# Patient Record
Sex: FEMALE | Race: WHITE | Employment: FULL TIME | URBAN - NONMETROPOLITAN AREA
[De-identification: names, ages, dates, MRNs, and addresses within clinical notes are randomized per-mention and may not be internally consistent; named-entity substitution may affect disease eponyms.]

---

## 2017-07-18 ENCOUNTER — HOSPITAL ENCOUNTER (EMERGENCY)
Age: 37
Discharge: HOME | End: 2017-07-18
Payer: MEDICARE

## 2017-07-18 VITALS
HEART RATE: 71 BPM | WEIGHT: 200 LBS | BODY MASS INDEX: 32.28 KG/M2 | DIASTOLIC BLOOD PRESSURE: 74 MMHG | SYSTOLIC BLOOD PRESSURE: 136 MMHG | RESPIRATION RATE: 16 BRPM | TEMPERATURE: 98.4 F | OXYGEN SATURATION: 98 %

## 2017-07-18 DIAGNOSIS — M77.02 EPICONDYLITIS ELBOW, MEDIAL, LEFT: ICD-10-CM

## 2017-07-18 DIAGNOSIS — M77.11 LATERAL EPICONDYLITIS OF BOTH ELBOWS: Primary | ICD-10-CM

## 2017-07-18 DIAGNOSIS — M77.01 EPICONDYLITIS ELBOW, MEDIAL, RIGHT: ICD-10-CM

## 2017-07-18 DIAGNOSIS — M77.12 LATERAL EPICONDYLITIS OF BOTH ELBOWS: Primary | ICD-10-CM

## 2017-07-18 PROCEDURE — 99213 OFFICE O/P EST LOW 20 MIN: CPT | Performed by: NURSE PRACTITIONER

## 2017-07-18 PROCEDURE — 99212 OFFICE O/P EST SF 10 MIN: CPT

## 2017-07-18 RX ORDER — NAPROXEN 500 MG/1
500 TABLET ORAL 2 TIMES DAILY
Qty: 20 TABLET | Refills: 0 | Status: SHIPPED | OUTPATIENT
Start: 2017-07-18 | End: 2017-12-18

## 2017-07-18 RX ORDER — PREDNISONE 10 MG/1
20 TABLET ORAL 2 TIMES DAILY
Qty: 14 TABLET | Refills: 0 | Status: SHIPPED | OUTPATIENT
Start: 2017-07-18 | End: 2017-07-25

## 2017-07-18 ASSESSMENT — ENCOUNTER SYMPTOMS
SHORTNESS OF BREATH: 0
ABDOMINAL PAIN: 0

## 2017-07-18 ASSESSMENT — PAIN DESCRIPTION - LOCATION: LOCATION: ELBOW

## 2017-07-18 ASSESSMENT — PAIN DESCRIPTION - PAIN TYPE: TYPE: ACUTE PAIN

## 2017-07-18 ASSESSMENT — PAIN SCALES - GENERAL: PAINLEVEL_OUTOF10: 10

## 2017-07-18 ASSESSMENT — PAIN DESCRIPTION - DESCRIPTORS: DESCRIPTORS: CONSTANT;SHARP;STABBING

## 2017-07-18 ASSESSMENT — PAIN DESCRIPTION - ORIENTATION: ORIENTATION: LEFT

## 2017-07-19 ENCOUNTER — OFFICE VISIT (OUTPATIENT)
Dept: FAMILY MEDICINE CLINIC | Age: 37
End: 2017-07-19
Payer: MEDICARE

## 2017-07-19 VITALS
TEMPERATURE: 98.2 F | WEIGHT: 200 LBS | SYSTOLIC BLOOD PRESSURE: 139 MMHG | BODY MASS INDEX: 32.14 KG/M2 | HEIGHT: 66 IN | DIASTOLIC BLOOD PRESSURE: 86 MMHG | HEART RATE: 69 BPM

## 2017-07-19 DIAGNOSIS — M77.12 EPICONDYLITIS, LATERAL, LEFT: ICD-10-CM

## 2017-07-19 DIAGNOSIS — M77.02 EPICONDYLITIS ELBOW, MEDIAL, LEFT: Primary | ICD-10-CM

## 2017-07-19 PROCEDURE — 99202 OFFICE O/P NEW SF 15 MIN: CPT | Performed by: NURSE PRACTITIONER

## 2017-07-19 ASSESSMENT — ENCOUNTER SYMPTOMS: COLOR CHANGE: 0

## 2017-12-18 ENCOUNTER — HOSPITAL ENCOUNTER (EMERGENCY)
Age: 37
Discharge: HOME | End: 2017-12-18
Payer: COMMERCIAL

## 2017-12-18 VITALS
TEMPERATURE: 98.5 F | HEART RATE: 75 BPM | OXYGEN SATURATION: 99 % | BODY MASS INDEX: 32.14 KG/M2 | RESPIRATION RATE: 18 BRPM | WEIGHT: 200 LBS | HEIGHT: 66 IN | DIASTOLIC BLOOD PRESSURE: 70 MMHG | SYSTOLIC BLOOD PRESSURE: 145 MMHG

## 2017-12-18 DIAGNOSIS — R19.7 DIARRHEA, UNSPECIFIED TYPE: Primary | ICD-10-CM

## 2017-12-18 LAB
BILIRUBIN URINE: NEGATIVE
BLOOD, URINE: NORMAL
CHARACTER, URINE: CLEAR
COLOR: YELLOW
GLUCOSE, URINE: NEGATIVE MG/DL
KETONES, URINE: NEGATIVE
LEUKOCYTES, UA: NEGATIVE
NITRATE, UA: NEGATIVE
PH UA: 7 (ref 5–9)
PROTEIN UA: NEGATIVE MG/DL
REFLEX TO URINE C & S: NORMAL
SPECIFIC GRAVITY UA: 1.01 (ref 1–1.03)
UROBILINOGEN, URINE: 0.2 EU/DL (ref 0–1)

## 2017-12-18 PROCEDURE — 81003 URINALYSIS AUTO W/O SCOPE: CPT

## 2017-12-18 PROCEDURE — 99213 OFFICE O/P EST LOW 20 MIN: CPT | Performed by: NURSE PRACTITIONER

## 2017-12-18 PROCEDURE — 99214 OFFICE O/P EST MOD 30 MIN: CPT

## 2017-12-18 ASSESSMENT — PAIN DESCRIPTION - ORIENTATION: ORIENTATION: MID

## 2017-12-18 ASSESSMENT — PAIN SCALES - GENERAL: PAINLEVEL_OUTOF10: 8

## 2017-12-18 ASSESSMENT — PAIN DESCRIPTION - DESCRIPTORS: DESCRIPTORS: CRAMPING

## 2017-12-18 ASSESSMENT — PAIN DESCRIPTION - LOCATION: LOCATION: ABDOMEN

## 2017-12-18 ASSESSMENT — ENCOUNTER SYMPTOMS
RECENT COUGH: 0
RESPIRATORY NEGATIVE: 1
VOMITING: 0
ABDOMINAL PAIN: 1
DIARRHEA: 1

## 2017-12-18 ASSESSMENT — PAIN DESCRIPTION - PAIN TYPE: TYPE: ACUTE PAIN

## 2017-12-18 ASSESSMENT — PAIN DESCRIPTION - FREQUENCY: FREQUENCY: INTERMITTENT

## 2017-12-18 NOTE — LETTER
[address] [facility]  [address] [place] [id number]  Phone: [phone]               December 18, 2017    Patient: Rabia Silveira   YOB: 1980   Date of Visit: 12/18/2017       To Whom It May Concern:    Abner Gudino was seen and treated in our emergency department on 12/18/2017. She may return to work on 12/19/2017.       Sincerely,       Wanda Bauman NP         Signature:_Electronically signed by Wanda Bauman NP on 12/18/2017 at 2:11 PM  _________________________________

## 2017-12-27 ENCOUNTER — HOSPITAL ENCOUNTER (OUTPATIENT)
Dept: ULTRASOUND IMAGING | Age: 37
Discharge: HOME | End: 2017-12-27
Payer: COMMERCIAL

## 2017-12-27 DIAGNOSIS — R10.9 ABDOMINAL PAIN, UNSPECIFIED ABDOMINAL LOCATION: ICD-10-CM

## 2017-12-27 DIAGNOSIS — K82.9 GALLBLADDER ATTACK: ICD-10-CM

## 2017-12-27 PROCEDURE — 76705 ECHO EXAM OF ABDOMEN: CPT

## 2017-12-30 ENCOUNTER — HOSPITAL ENCOUNTER (OUTPATIENT)
Age: 37
Discharge: HOME | End: 2017-12-30
Payer: MEDICARE

## 2018-01-17 ENCOUNTER — HOSPITAL ENCOUNTER (OUTPATIENT)
Dept: NUCLEAR MEDICINE | Age: 38
Discharge: HOME | End: 2018-01-17
Payer: MEDICARE

## 2018-01-17 VITALS — BODY MASS INDEX: 33.27 KG/M2 | HEIGHT: 66 IN | WEIGHT: 207 LBS

## 2018-01-17 DIAGNOSIS — R19.7 DIARRHEA, UNSPECIFIED TYPE: ICD-10-CM

## 2018-01-17 DIAGNOSIS — R10.33 PERIUMBILICAL PAIN: ICD-10-CM

## 2018-01-17 DIAGNOSIS — R11.0 NAUSEA: ICD-10-CM

## 2018-01-17 PROCEDURE — A9537 TC99M MEBROFENIN: HCPCS | Performed by: NURSE PRACTITIONER

## 2018-01-17 PROCEDURE — 6360000004 HC RX CONTRAST MEDICATION: Performed by: RADIOLOGY

## 2018-01-17 PROCEDURE — 78227 HEPATOBIL SYST IMAGE W/DRUG: CPT

## 2018-01-17 PROCEDURE — 3430000000 HC RX DIAGNOSTIC RADIOPHARMACEUTICAL: Performed by: NURSE PRACTITIONER

## 2018-01-17 RX ADMIN — Medication 8.1 MILLICURIE: at 09:55

## 2018-01-17 RX ADMIN — SINCALIDE 1.88 MCG: 5 INJECTION, POWDER, LYOPHILIZED, FOR SOLUTION INTRAVENOUS at 10:55

## 2018-07-11 ENCOUNTER — HOSPITAL ENCOUNTER (EMERGENCY)
Age: 38
Discharge: HOME | End: 2018-07-11
Payer: MEDICARE

## 2018-07-11 VITALS
BODY MASS INDEX: 33.75 KG/M2 | WEIGHT: 210 LBS | DIASTOLIC BLOOD PRESSURE: 83 MMHG | TEMPERATURE: 98.5 F | HEART RATE: 80 BPM | HEIGHT: 66 IN | RESPIRATION RATE: 16 BRPM | OXYGEN SATURATION: 98 % | SYSTOLIC BLOOD PRESSURE: 149 MMHG

## 2018-07-11 DIAGNOSIS — J01.00 ACUTE MAXILLARY SINUSITIS, RECURRENCE NOT SPECIFIED: Primary | ICD-10-CM

## 2018-07-11 PROCEDURE — 99213 OFFICE O/P EST LOW 20 MIN: CPT | Performed by: NURSE PRACTITIONER

## 2018-07-11 PROCEDURE — 99213 OFFICE O/P EST LOW 20 MIN: CPT

## 2018-07-11 RX ORDER — IBUPROFEN 200 MG
800 TABLET ORAL EVERY 6 HOURS PRN
COMMUNITY

## 2018-07-11 RX ORDER — DOXYCYCLINE HYCLATE 100 MG
100 TABLET ORAL 2 TIMES DAILY
Qty: 20 TABLET | Refills: 0 | Status: SHIPPED | OUTPATIENT
Start: 2018-07-11 | End: 2018-07-21

## 2018-07-11 ASSESSMENT — ENCOUNTER SYMPTOMS
ABDOMINAL PAIN: 0
RHINORRHEA: 0
SINUS PAIN: 1
COUGH: 1
NAUSEA: 0
VOMITING: 0
SHORTNESS OF BREATH: 0
PHOTOPHOBIA: 0
WHEEZING: 0
CONSTIPATION: 0
BACK PAIN: 0
SINUS PRESSURE: 1
SORE THROAT: 0
APNEA: 0
DIARRHEA: 0

## 2018-07-11 ASSESSMENT — PAIN SCALES - GENERAL: PAINLEVEL_OUTOF10: 8

## 2018-07-11 ASSESSMENT — PAIN DESCRIPTION - LOCATION: LOCATION: EAR

## 2018-07-11 ASSESSMENT — PAIN DESCRIPTION - ORIENTATION: ORIENTATION: LEFT

## 2018-07-11 ASSESSMENT — PAIN DESCRIPTION - DESCRIPTORS: DESCRIPTORS: ACHING

## 2018-07-11 NOTE — ED PROVIDER NOTES
Vitals:    07/11/18 0943   BP: (!) 149/83   Pulse: 80   Resp: 16   Temp: 98.5 °F (36.9 °C)   TempSrc: Oral   SpO2: 98%   Weight: 210 lb (95.3 kg)   Height: 5' 6\" (1.676 m)       Medications - No data to display  PROCEDURES:  None  FINAL IMPRESSION      1. Acute maxillary sinusitis, recurrence not specified        DISPOSITION/PLAN   DISPOSITION Decision To Discharge 07/11/2018 09:52:27 AM    Will begin treatment for acute maxillary sinusitis. Patient is allergic to PCN. Discussed findings and treatment plan with patient. She verbalizes understanding and agrees with plan of care.       PATIENT REFERRED TO:  KEDAR Calderón CNP  [address]  [phone]      If symptoms worsen or go to emergency department    DISCHARGE MEDICATIONS:  Discharge Medication List as of 7/11/2018  9:54 AM      START taking these medications    Details   doxycycline hyclate (VIBRA-TABS) 100 MG tablet Take 1 tablet by mouth 2 times daily for 10 days, Disp-20 tablet, R-0Normal           Discharge Medication List as of 7/11/2018  9:54 AM          KEDAR Gastelum CNP, APRN - CNP  07/11/18 1011

## 2019-07-26 ENCOUNTER — HOSPITAL ENCOUNTER (EMERGENCY)
Age: 39
Discharge: HOME | End: 2019-07-26
Payer: MEDICARE

## 2019-07-26 VITALS
OXYGEN SATURATION: 97 % | HEART RATE: 86 BPM | DIASTOLIC BLOOD PRESSURE: 94 MMHG | HEIGHT: 66 IN | SYSTOLIC BLOOD PRESSURE: 131 MMHG | RESPIRATION RATE: 16 BRPM | TEMPERATURE: 97.6 F | BODY MASS INDEX: 34.07 KG/M2 | WEIGHT: 212 LBS

## 2019-07-26 DIAGNOSIS — J00 ACUTE NASOPHARYNGITIS: Primary | ICD-10-CM

## 2019-07-26 PROCEDURE — 99212 OFFICE O/P EST SF 10 MIN: CPT

## 2019-07-26 PROCEDURE — 99214 OFFICE O/P EST MOD 30 MIN: CPT | Performed by: NURSE PRACTITIONER

## 2019-07-26 RX ORDER — DOXYCYCLINE HYCLATE 100 MG
100 TABLET ORAL 2 TIMES DAILY
Qty: 20 TABLET | Refills: 0 | Status: SHIPPED | OUTPATIENT
Start: 2019-07-26 | End: 2019-08-05

## 2019-07-26 RX ORDER — METHYLPREDNISOLONE 4 MG/1
TABLET ORAL
Qty: 1 KIT | Refills: 0 | Status: SHIPPED | OUTPATIENT
Start: 2019-07-26 | End: 2019-10-29

## 2019-07-26 ASSESSMENT — ENCOUNTER SYMPTOMS
COLOR CHANGE: 0
SWOLLEN GLANDS: 1
ALLERGIC/IMMUNOLOGIC NEGATIVE: 1
EYES NEGATIVE: 1
CHEST TIGHTNESS: 0
BACK PAIN: 1
GASTROINTESTINAL NEGATIVE: 1
COUGH: 1
SORE THROAT: 1
EYE ITCHING: 0
EYE PAIN: 0

## 2019-07-26 ASSESSMENT — PAIN DESCRIPTION - PAIN TYPE: TYPE: ACUTE PAIN

## 2019-07-26 ASSESSMENT — PAIN DESCRIPTION - DESCRIPTORS
DESCRIPTORS_2: BURNING
DESCRIPTORS: HEADACHE

## 2019-07-26 ASSESSMENT — PAIN DESCRIPTION - LOCATION: LOCATION_2: BACK

## 2019-07-26 ASSESSMENT — PAIN DESCRIPTION - ORIENTATION: ORIENTATION_2: RIGHT;LOWER

## 2019-07-26 ASSESSMENT — PAIN SCALES - GENERAL: PAINLEVEL_OUTOF10: 7

## 2019-07-26 ASSESSMENT — PAIN - FUNCTIONAL ASSESSMENT: PAIN_FUNCTIONAL_ASSESSMENT: PREVENTS OR INTERFERES SOME ACTIVE ACTIVITIES AND ADLS

## 2019-10-29 ENCOUNTER — HOSPITAL ENCOUNTER (EMERGENCY)
Age: 39
Discharge: HOME | End: 2019-10-29
Attending: FAMILY MEDICINE
Payer: MEDICARE

## 2019-10-29 VITALS
DIASTOLIC BLOOD PRESSURE: 93 MMHG | HEART RATE: 89 BPM | BODY MASS INDEX: 35.36 KG/M2 | WEIGHT: 220 LBS | OXYGEN SATURATION: 97 % | HEIGHT: 66 IN | RESPIRATION RATE: 16 BRPM | SYSTOLIC BLOOD PRESSURE: 155 MMHG | TEMPERATURE: 98.4 F

## 2019-10-29 DIAGNOSIS — N30.00 ACUTE CYSTITIS WITHOUT HEMATURIA: ICD-10-CM

## 2019-10-29 DIAGNOSIS — G44.209 MUSCLE TENSION HEADACHE: ICD-10-CM

## 2019-10-29 DIAGNOSIS — K29.00 OTHER ACUTE GASTRITIS WITHOUT HEMORRHAGE: Primary | ICD-10-CM

## 2019-10-29 LAB
ALBUMIN SERPL-MCNC: 4.7 G/DL (ref 3.5–5.1)
ALP BLD-CCNC: 64 U/L (ref 38–126)
ALT SERPL-CCNC: 22 U/L (ref 11–66)
ANION GAP SERPL CALCULATED.3IONS-SCNC: 17 MEQ/L (ref 8–16)
AST SERPL-CCNC: 23 U/L (ref 5–40)
BACTERIA: ABNORMAL /HPF
BASOPHILS # BLD: 0.4 %
BASOPHILS ABSOLUTE: 0.1 THOU/MM3 (ref 0–0.1)
BILIRUB SERPL-MCNC: 0.7 MG/DL (ref 0.3–1.2)
BILIRUBIN DIRECT: < 0.2 MG/DL (ref 0–0.3)
BILIRUBIN URINE: ABNORMAL
BLOOD, URINE: ABNORMAL
BUN BLDV-MCNC: 15 MG/DL (ref 7–22)
CALCIUM SERPL-MCNC: 9.7 MG/DL (ref 8.5–10.5)
CASTS 2: ABNORMAL /LPF
CASTS UA: ABNORMAL /LPF
CHARACTER, URINE: ABNORMAL
CHLORIDE BLD-SCNC: 105 MEQ/L (ref 98–111)
CO2: 22 MEQ/L (ref 23–33)
COLOR: ABNORMAL
CREAT SERPL-MCNC: 0.7 MG/DL (ref 0.4–1.2)
CRYSTALS, UA: ABNORMAL
EOSINOPHIL # BLD: 0.3 %
EOSINOPHILS ABSOLUTE: 0.1 THOU/MM3 (ref 0–0.4)
EPITHELIAL CELLS, UA: ABNORMAL /HPF
ERYTHROCYTE [DISTWIDTH] IN BLOOD BY AUTOMATED COUNT: 13.2 % (ref 11.5–14.5)
ERYTHROCYTE [DISTWIDTH] IN BLOOD BY AUTOMATED COUNT: 48.6 FL (ref 35–45)
FLU A ANTIGEN: NEGATIVE
FLU B ANTIGEN: NEGATIVE
GFR SERPL CREATININE-BSD FRML MDRD: > 90 ML/MIN/1.73M2
GLUCOSE BLD-MCNC: 114 MG/DL (ref 70–108)
GLUCOSE URINE: 100 MG/DL
HCT VFR BLD CALC: 49.3 % (ref 37–47)
HEMOGLOBIN: 15.9 GM/DL (ref 12–16)
ICTOTEST: NEGATIVE
IMMATURE GRANS (ABS): 0.1 THOU/MM3 (ref 0–0.07)
IMMATURE GRANULOCYTES: 0.6 %
KETONES, URINE: 80
LEUKOCYTE ESTERASE, URINE: NEGATIVE
LIPASE: 15 U/L (ref 5.6–51.3)
LYMPHOCYTES # BLD: 14.7 %
LYMPHOCYTES ABSOLUTE: 2.6 THOU/MM3 (ref 1–4.8)
MCH RBC QN AUTO: 32.2 PG (ref 26–33)
MCHC RBC AUTO-ENTMCNC: 32.3 GM/DL (ref 32.2–35.5)
MCV RBC AUTO: 99.8 FL (ref 81–99)
MONOCYTES # BLD: 7.6 %
MONOCYTES ABSOLUTE: 1.3 THOU/MM3 (ref 0.4–1.3)
NITRITE, URINE: NEGATIVE
NUCLEATED RED BLOOD CELLS: 0 /100 WBC
OSMOLALITY CALCULATION: 288.5 MOSMOL/KG (ref 275–300)
PH UA: 6.5 (ref 5–9)
PLATELET # BLD: 69 THOU/MM3 (ref 130–400)
PLATELET ESTIMATE: ABNORMAL
PMV BLD AUTO: 11.2 FL (ref 9.4–12.4)
POTASSIUM SERPL-SCNC: 3.5 MEQ/L (ref 3.5–5.2)
PREGNANCY, SERUM: NEGATIVE
PROTEIN UA: > 300
RBC # BLD: 4.94 MILL/MM3 (ref 4.2–5.4)
RBC URINE: > 200 /HPF
SCAN OF BLOOD SMEAR: NORMAL
SEG NEUTROPHILS: 76.4 %
SEGMENTED NEUTROPHILS ABSOLUTE COUNT: 13.3 THOU/MM3 (ref 1.8–7.7)
SODIUM BLD-SCNC: 144 MEQ/L (ref 135–145)
SPECIFIC GRAVITY, URINE: > 1.03 (ref 1–1.03)
TOTAL PROTEIN: 8.2 G/DL (ref 6.1–8)
UROBILINOGEN, URINE: 0.2 EU/DL (ref 0–1)
WBC # BLD: 17.4 THOU/MM3 (ref 4.8–10.8)
WBC UA: ABNORMAL /HPF

## 2019-10-29 PROCEDURE — 84703 CHORIONIC GONADOTROPIN ASSAY: CPT

## 2019-10-29 PROCEDURE — 2500000003 HC RX 250 WO HCPCS: Performed by: FAMILY MEDICINE

## 2019-10-29 PROCEDURE — 96375 TX/PRO/DX INJ NEW DRUG ADDON: CPT

## 2019-10-29 PROCEDURE — 82248 BILIRUBIN DIRECT: CPT

## 2019-10-29 PROCEDURE — 96361 HYDRATE IV INFUSION ADD-ON: CPT

## 2019-10-29 PROCEDURE — 83690 ASSAY OF LIPASE: CPT

## 2019-10-29 PROCEDURE — 2580000003 HC RX 258: Performed by: FAMILY MEDICINE

## 2019-10-29 PROCEDURE — 85025 COMPLETE CBC W/AUTO DIFF WBC: CPT

## 2019-10-29 PROCEDURE — 96374 THER/PROPH/DIAG INJ IV PUSH: CPT

## 2019-10-29 PROCEDURE — 87804 INFLUENZA ASSAY W/OPTIC: CPT

## 2019-10-29 PROCEDURE — 99284 EMERGENCY DEPT VISIT MOD MDM: CPT

## 2019-10-29 PROCEDURE — 80053 COMPREHEN METABOLIC PANEL: CPT

## 2019-10-29 PROCEDURE — 6360000002 HC RX W HCPCS: Performed by: FAMILY MEDICINE

## 2019-10-29 PROCEDURE — 6370000000 HC RX 637 (ALT 250 FOR IP): Performed by: FAMILY MEDICINE

## 2019-10-29 PROCEDURE — 81001 URINALYSIS AUTO W/SCOPE: CPT

## 2019-10-29 PROCEDURE — 36415 COLL VENOUS BLD VENIPUNCTURE: CPT

## 2019-10-29 RX ORDER — NITROFURANTOIN 25; 75 MG/1; MG/1
100 CAPSULE ORAL 2 TIMES DAILY
Qty: 10 CAPSULE | Refills: 0 | Status: SHIPPED | OUTPATIENT
Start: 2019-10-29 | End: 2019-11-03

## 2019-10-29 RX ORDER — ONDANSETRON 4 MG/1
4 TABLET, ORALLY DISINTEGRATING ORAL 3 TIMES DAILY PRN
Qty: 21 TABLET | Refills: 0 | Status: SHIPPED | OUTPATIENT
Start: 2019-10-29 | End: 2022-07-25 | Stop reason: ALTCHOICE

## 2019-10-29 RX ORDER — SUCRALFATE 1 G/1
1 TABLET ORAL 4 TIMES DAILY
Qty: 40 TABLET | Refills: 0 | Status: SHIPPED | OUTPATIENT
Start: 2019-10-29 | End: 2022-07-25 | Stop reason: ALTCHOICE

## 2019-10-29 RX ORDER — KETOROLAC TROMETHAMINE 30 MG/ML
30 INJECTION, SOLUTION INTRAMUSCULAR; INTRAVENOUS ONCE
Status: COMPLETED | OUTPATIENT
Start: 2019-10-29 | End: 2019-10-29

## 2019-10-29 RX ORDER — ONDANSETRON 2 MG/ML
4 INJECTION INTRAMUSCULAR; INTRAVENOUS ONCE
Status: COMPLETED | OUTPATIENT
Start: 2019-10-29 | End: 2019-10-29

## 2019-10-29 RX ORDER — 0.9 % SODIUM CHLORIDE 0.9 %
1000 INTRAVENOUS SOLUTION INTRAVENOUS ONCE
Status: COMPLETED | OUTPATIENT
Start: 2019-10-29 | End: 2019-10-29

## 2019-10-29 RX ADMIN — KETOROLAC TROMETHAMINE 30 MG: 30 INJECTION, SOLUTION INTRAMUSCULAR at 19:10

## 2019-10-29 RX ADMIN — FAMOTIDINE 20 MG: 10 INJECTION, SOLUTION INTRAVENOUS at 16:37

## 2019-10-29 RX ADMIN — LIDOCAINE HYDROCHLORIDE: 20 SOLUTION ORAL; TOPICAL at 16:40

## 2019-10-29 RX ADMIN — SODIUM CHLORIDE 1000 ML: 9 INJECTION, SOLUTION INTRAVENOUS at 16:37

## 2019-10-29 RX ADMIN — ONDANSETRON 4 MG: 2 INJECTION INTRAMUSCULAR; INTRAVENOUS at 16:37

## 2019-10-29 ASSESSMENT — ENCOUNTER SYMPTOMS
SORE THROAT: 0
BLOOD IN STOOL: 0
CONSTIPATION: 1
EYE DISCHARGE: 0
BACK PAIN: 0
EYE PAIN: 0
WHEEZING: 0
NAUSEA: 1
VOMITING: 1
SHORTNESS OF BREATH: 0
RHINORRHEA: 0
COUGH: 0
DIARRHEA: 0
ABDOMINAL PAIN: 1

## 2019-10-29 ASSESSMENT — PAIN SCALES - GENERAL
PAINLEVEL_OUTOF10: 10
PAINLEVEL_OUTOF10: 8

## 2019-10-29 ASSESSMENT — PAIN DESCRIPTION - LOCATION: LOCATION: ABDOMEN

## 2019-10-29 ASSESSMENT — PAIN DESCRIPTION - PAIN TYPE: TYPE: ACUTE PAIN

## 2020-09-24 ENCOUNTER — HOSPITAL ENCOUNTER (OUTPATIENT)
Age: 40
Setting detail: SPECIMEN
Discharge: HOME | End: 2020-09-24
Payer: COMMERCIAL

## 2020-09-25 LAB
CULTURE: NO GROWTH
Lab: NORMAL
SPECIMEN DESCRIPTION: NORMAL

## 2022-05-23 ENCOUNTER — HOSPITAL ENCOUNTER (EMERGENCY)
Age: 42
Discharge: HOME | End: 2022-05-23
Payer: MEDICARE

## 2022-05-23 VITALS
SYSTOLIC BLOOD PRESSURE: 125 MMHG | OXYGEN SATURATION: 96 % | WEIGHT: 224 LBS | DIASTOLIC BLOOD PRESSURE: 72 MMHG | HEART RATE: 84 BPM | TEMPERATURE: 98.1 F | RESPIRATION RATE: 16 BRPM | BODY MASS INDEX: 36.15 KG/M2

## 2022-05-23 DIAGNOSIS — S29.011A MUSCLE STRAIN OF CHEST WALL, INITIAL ENCOUNTER: Primary | ICD-10-CM

## 2022-05-23 LAB
BILIRUBIN URINE: ABNORMAL
BLOOD, URINE: NEGATIVE
CHARACTER, URINE: CLEAR
COLOR: ABNORMAL
GLUCOSE URINE: NEGATIVE MG/DL
KETONES, URINE: ABNORMAL
LEUKOCYTE ESTERASE, URINE: NEGATIVE
NITRITE, URINE: NEGATIVE
PH UA: 5.5 (ref 5–9)
PROTEIN UA: 100 MG/DL
SPECIFIC GRAVITY UA: >= 1.03 (ref 1–1.03)
UROBILINOGEN, URINE: 0.2 EU/DL (ref 0.2–1)

## 2022-05-23 PROCEDURE — 99213 OFFICE O/P EST LOW 20 MIN: CPT | Performed by: NURSE PRACTITIONER

## 2022-05-23 PROCEDURE — 81003 URINALYSIS AUTO W/O SCOPE: CPT

## 2022-05-23 PROCEDURE — 99213 OFFICE O/P EST LOW 20 MIN: CPT

## 2022-05-23 RX ORDER — LIDOCAINE 50 MG/G
OINTMENT TOPICAL
Qty: 50 G | Refills: 0 | Status: SHIPPED | OUTPATIENT
Start: 2022-05-23 | End: 2022-07-25 | Stop reason: ALTCHOICE

## 2022-05-23 RX ORDER — ACETAMINOPHEN 325 MG/1
650 TABLET ORAL EVERY 6 HOURS PRN
COMMUNITY

## 2022-05-23 RX ORDER — BUSPIRONE HYDROCHLORIDE 10 MG/1
10 TABLET ORAL DAILY
COMMUNITY

## 2022-05-23 RX ORDER — CITALOPRAM 20 MG/1
20 TABLET ORAL DAILY
COMMUNITY
End: 2022-07-25 | Stop reason: ALTCHOICE

## 2022-05-23 RX ORDER — PREDNISONE 20 MG/1
40 TABLET ORAL DAILY
Qty: 14 TABLET | Refills: 0 | Status: SHIPPED | OUTPATIENT
Start: 2022-05-23 | End: 2022-05-30

## 2022-05-23 ASSESSMENT — PAIN DESCRIPTION - ONSET: ONSET: PROGRESSIVE

## 2022-05-23 ASSESSMENT — PAIN DESCRIPTION - ORIENTATION: ORIENTATION: RIGHT;LEFT

## 2022-05-23 ASSESSMENT — PAIN DESCRIPTION - DESCRIPTORS: DESCRIPTORS: ACHING

## 2022-05-23 ASSESSMENT — ENCOUNTER SYMPTOMS
SHORTNESS OF BREATH: 0
BACK PAIN: 1
SORE THROAT: 0
VOMITING: 0
COUGH: 1
NAUSEA: 0

## 2022-05-23 ASSESSMENT — PAIN DESCRIPTION - FREQUENCY: FREQUENCY: CONTINUOUS

## 2022-05-23 ASSESSMENT — PAIN SCALES - GENERAL: PAINLEVEL_OUTOF10: 7

## 2022-05-23 ASSESSMENT — PAIN DESCRIPTION - PAIN TYPE: TYPE: ACUTE PAIN

## 2022-05-23 ASSESSMENT — PAIN - FUNCTIONAL ASSESSMENT
PAIN_FUNCTIONAL_ASSESSMENT: PREVENTS OR INTERFERES SOME ACTIVE ACTIVITIES AND ADLS
PAIN_FUNCTIONAL_ASSESSMENT: 0-10

## 2022-05-23 ASSESSMENT — PAIN DESCRIPTION - LOCATION: LOCATION: BACK

## 2022-05-23 NOTE — ED PROVIDER NOTES
[facility]       CHIEF COMPLAINT       Chief Complaint   Patient presents with    Abdominal Pain    Dysuria    Back Pain     low    Cough     through the night       Nurses Notes reviewed and I agree except as noted in the HPI. HISTORY OF PRESENT ILLNESS   Judith Shaver is a 36 y.o. female who presents evaluation of mid back and anterior chest wall pain. Patient states that she woke up with the pain this morning. She states that her significant other informed her that she was coughing frequently throughout the night last night and she believes this is related to the pain as every time she tries to take a deep breath or move in certain directions the pain is exacerbated. She states that her urine has been dark but denies any significant dysuria or urinary frequency. She denies any fever, chills, nausea, or vomiting. She states that she did see her chiropractor for the pain this morning and has taken some over-the-counter Tylenol with Neurontin, that is prescribed to her, which did help somewhat with the pain. The history is provided by the patient. REVIEW OF SYSTEMS     Review of Systems   Constitutional: Negative for chills and fever. HENT: Negative for congestion and sore throat. Respiratory: Positive for cough. Negative for shortness of breath. Cardiovascular: Negative for chest pain. Gastrointestinal: Negative for nausea and vomiting. Genitourinary: Negative for dysuria. Musculoskeletal: Positive for back pain. Negative for arthralgias and myalgias. Skin: Negative for rash. Allergic/Immunologic: Negative for environmental allergies. Neurological: Negative for headaches.        PAST MEDICAL HISTORY         Diagnosis Date    Anxiety     Depression     HPV (human papilloma virus) infection        SURGICALHISTORY     Patient  has a past surgical history that includes laparoscopy; Myringotomy Tympanostomy Tube Placement; Tonsillectomy (1984); Tubal ligation (2002); and Hysterectomy (03/05/2014). CURRENT MEDICATIONS       Previous Medications    ACETAMINOPHEN (TYLENOL) 325 MG TABLET    Take 650 mg by mouth every 6 hours as needed for Pain    BUSPIRONE (BUSPAR) 10 MG TABLET    Take 10 mg by mouth daily    CETIRIZINE (ZYRTEC) 10 MG TABLET    Take 10 mg by mouth daily    CITALOPRAM (CELEXA) 20 MG TABLET    Take 20 mg by mouth daily    IBUPROFEN (ADVIL;MOTRIN) 200 MG TABLET    Take 800 mg by mouth every 6 hours as needed for Pain    OMEPRAZOLE (PRILOSEC) 20 MG DELAYED RELEASE CAPSULE    Take 1 capsule by mouth daily    ONDANSETRON (ZOFRAN-ODT) 4 MG DISINTEGRATING TABLET    Take 1 tablet by mouth 3 times daily as needed for Nausea or Vomiting    RANITIDINE HCL (ZANTAC PO)    Take by mouth    SUCRALFATE (CARAFATE) 1 GM TABLET    Take 1 tablet by mouth 4 times daily for 10 days       ALLERGIES     Patient is is allergic to vicodin [hydrocodone-acetaminophen] and penicillins. Patients   There is no immunization history on file for this patient. FAMILY HISTORY     Patient's family history includes Arthritis in her maternal grandmother; Cancer in her maternal grandmother; Heart Disease in her maternal grandfather; Other in her mother. SOCIAL HISTORY     Patient  reports that she has been smoking cigarettes. She has a 5.00 pack-year smoking history. She has never used smokeless tobacco. She reports current alcohol use. She reports that she does not use drugs. PHYSICAL EXAM     ED TRIAGE VITALS  BP: 125/72, Temp: 98.1 °F (36.7 °C), Pulse: 84, Resp: 16, SpO2: 96 %,Estimated body mass index is 36.15 kg/m² as calculated from the following:    Height as of 10/29/19: 5' 6\" (1.676 m). Weight as of this encounter: 224 lb (101.6 kg). ,Patient's last menstrual period was 10/28/2013. Physical Exam  Vitals and nursing note reviewed. Constitutional:       General: She is not in acute distress.      Appearance: She is well-developed. She is not diaphoretic. Eyes:      Conjunctiva/sclera:      Right eye: Right conjunctiva is not injected. Left eye: Left conjunctiva is not injected. Pupils: Pupils are equal.   Cardiovascular:      Rate and Rhythm: Normal rate and regular rhythm. Heart sounds: No murmur heard. Pulmonary:      Effort: Pulmonary effort is normal. No respiratory distress. Breath sounds: Normal breath sounds. Chest:       Musculoskeletal:      Cervical back: Normal range of motion. Back:    Skin:     General: Skin is warm. Findings: No rash. Neurological:      Mental Status: She is alert and oriented to person, place, and time. Psychiatric:         Behavior: Behavior normal.         DIAGNOSTIC RESULTS     Labs:  Results for orders placed or performed during the hospital encounter of 05/23/22   Urinalysis   Result Value Ref Range    Glucose, Ur Negative NEGATIVE mg/dl    Bilirubin Urine Small (A) NEGATIVE    Ketones, Urine Trace (A) NEGATIVE    Specific Gravity, UA >=1.030 1.002 - 1.030    Blood, Urine Negative NEGATIVE    pH, UA 5.50 5.0 - 9.0    Protein,  (A) NEGATIVE mg/dl    Urobilinogen, Urine 0.20 0.2 - 1.0 eu/dl    Nitrite, Urine Negative NEGATIVE    Leukocyte Esterase, Urine Negative NEGATIVE    Color, UA Dark yellow (A) STRAW-YELLOW    Character, Urine Clear CLEAR-SL CLOUD       IMAGING:    No orders to display         EKG:      URGENT CARE COURSE:     Vitals:    05/23/22 1526   BP: 125/72   Pulse: 84   Resp: 16   Temp: 98.1 °F (36.7 °C)   TempSrc: Temporal   SpO2: 96%   Weight: 224 lb (101.6 kg)       Medications - No data to display         PROCEDURES:  None    FINAL IMPRESSION      1. Muscle strain of chest wall, initial encounter          DISPOSITION/ PLAN     I discussed with the patient that I believe exam is consistent with a chest wall strain at this time.   I discussed the plan to treat with prednisone and lidocaine ointment and she is advised to continue over-the-counter medications at home. She is advised to follow-up on an outpatient basis if her symptoms do not improve and to use ice and heat. Did discuss the importance of continued coughing and deep breathing when needed and she is agreeable to plan as discussed. PATIENT REFERRED TO:  No primary care provider on file. No primary physician on file. DISCHARGE MEDICATIONS:  New Prescriptions    LIDOCAINE (XYLOCAINE) 5 % OINTMENT    Apply topically as needed.     PREDNISONE (DELTASONE) 20 MG TABLET    Take 2 tablets by mouth daily for 7 days       Discontinued Medications    No medications on file       Current Discharge Medication List          Luevenia Roads, APRN - CNP    (Please note that portions of this note were completed with a voice recognition program. Efforts were made to edit the dictations but occasionally words are mis-transcribed.)          KEDAR Cordero CNP  05/23/22 0978

## 2022-05-23 NOTE — ED TRIAGE NOTES
Pt to room 3 with c/o some low and lateral abdominal pain, low back pain and dysuria starting yesterday/last night. She reports that her  told her that she coughed all night but she hasn't coughed at all today.

## 2022-05-27 ENCOUNTER — HOSPITAL ENCOUNTER (OUTPATIENT)
Dept: GENERAL RADIOLOGY | Age: 42
Discharge: HOME | End: 2022-05-27
Payer: MEDICARE

## 2022-05-27 ENCOUNTER — HOSPITAL ENCOUNTER (OUTPATIENT)
Age: 42
Discharge: HOME | End: 2022-05-27
Payer: MEDICARE

## 2022-05-27 DIAGNOSIS — S23.3XXA SPRAIN OF LIGAMENTS OF THORACIC SPINE, INITIAL ENCOUNTER: ICD-10-CM

## 2022-05-27 DIAGNOSIS — M51.34 ANNULAR TEAR OF THORACIC DISC: ICD-10-CM

## 2022-05-27 PROCEDURE — 72072 X-RAY EXAM THORAC SPINE 3VWS: CPT

## 2022-07-25 ENCOUNTER — HOSPITAL ENCOUNTER (EMERGENCY)
Age: 42
Discharge: HOME | End: 2022-07-25
Payer: MEDICARE

## 2022-07-25 VITALS
HEART RATE: 89 BPM | WEIGHT: 224 LBS | DIASTOLIC BLOOD PRESSURE: 88 MMHG | TEMPERATURE: 98.7 F | RESPIRATION RATE: 18 BRPM | OXYGEN SATURATION: 99 % | SYSTOLIC BLOOD PRESSURE: 151 MMHG | BODY MASS INDEX: 36.15 KG/M2

## 2022-07-25 DIAGNOSIS — U07.1 COVID-19: Primary | ICD-10-CM

## 2022-07-25 LAB — SARS-COV-2, NAA: DETECTED

## 2022-07-25 PROCEDURE — 99213 OFFICE O/P EST LOW 20 MIN: CPT

## 2022-07-25 PROCEDURE — 87635 SARS-COV-2 COVID-19 AMP PRB: CPT

## 2022-07-25 PROCEDURE — 99213 OFFICE O/P EST LOW 20 MIN: CPT | Performed by: NURSE PRACTITIONER

## 2022-07-25 RX ORDER — GABAPENTIN 100 MG/1
100 CAPSULE ORAL 3 TIMES DAILY
COMMUNITY

## 2022-07-25 ASSESSMENT — ENCOUNTER SYMPTOMS
COUGH: 0
SHORTNESS OF BREATH: 0
SORE THROAT: 0
CHEST TIGHTNESS: 0
RHINORRHEA: 1
VOMITING: 0
SINUS PRESSURE: 1
NAUSEA: 0
DIARRHEA: 0

## 2022-07-25 ASSESSMENT — PAIN - FUNCTIONAL ASSESSMENT: PAIN_FUNCTIONAL_ASSESSMENT: NONE - DENIES PAIN

## 2022-07-25 NOTE — Clinical Note
David Serrano was seen and treated in our emergency department on 7/25/2022. She may return to work on 07/27/2022. May be off work 2 days, CDC recommendation of isolation for 5 days then can return to work if symptoms are improving or resolved with a mask. If you have any questions or concerns, please don't hesitate to call.       Oracio Love, APRN - CNP

## 2022-07-25 NOTE — ED TRIAGE NOTES
Patient to room with c/o head congestion and watery eye beginning this morning. Requests COVID testing. Nasopharyngeal COVID swab obtained. Patient tolerated well.

## 2022-07-25 NOTE — ED PROVIDER NOTES
[facility]  Urgent Care Encounter       CHIEF COMPLAINT       Chief Complaint   Patient presents with    Head Congestion       Nurses Notes reviewed and I agree except as noted in the HPI. HISTORY OF PRESENT ILLNESS   Nixon Smith is a 36 y.o. female who presents to the [facility] urgent care for evaluation of head congestion. She does report distant exposure to COVID-19 through her son and son's friend. She reports her symptoms as nasal congestion, rhinorrhea, and sinus pressure. She denies fever or chills. She does report fatigue. She denies nausea, vomiting or diarrhea. The history is provided by the patient. No  was used. REVIEW OF SYSTEMS     Review of Systems   Constitutional:  Negative for activity change, appetite change, chills, fatigue and fever. HENT:  Positive for congestion, rhinorrhea and sinus pressure. Negative for ear discharge, ear pain and sore throat. Respiratory:  Negative for cough, chest tightness and shortness of breath. Cardiovascular:  Negative for chest pain. Gastrointestinal:  Negative for diarrhea, nausea and vomiting. Genitourinary:  Negative for dysuria. Skin:  Negative for rash. Allergic/Immunologic: Negative for environmental allergies and food allergies. Neurological:  Negative for dizziness and headaches. PAST MEDICAL HISTORY         Diagnosis Date    Anxiety     Depression     HPV (human papilloma virus) infection        SURGICALHISTORY     Patient  has a past surgical history that includes laparoscopy; Myringotomy Tympanostomy Tube Placement; Tonsillectomy (1984); Tubal ligation (2002); and Hysterectomy (03/05/2014).     CURRENT MEDICATIONS       Discharge Medication List as of 7/25/2022  3:41 PM        CONTINUE these medications which have NOT CHANGED    Details   QUEtiapine Fumarate (SEROQUEL PO) Take 50 mg by mouthHistorical Med      gabapentin (NEURONTIN) 100 MG capsule Take 100 mg by mouth in the morning and 100 mg at noon and 100 mg before bedtime. Historical Med      acetaminophen (TYLENOL) 325 MG tablet Take 650 mg by mouth every 6 hours as needed for PainHistorical Med      busPIRone (BUSPAR) 10 MG tablet Take 10 mg by mouth dailyHistorical Med      ibuprofen (ADVIL;MOTRIN) 200 MG tablet Take 800 mg by mouth every 6 hours as needed for PainHistorical Med      omeprazole (PRILOSEC) 20 MG delayed release capsule Take 1 capsule by mouth daily, Disp-30 capsule, R-0Print      cetirizine (ZYRTEC) 10 MG tablet Take 10 mg by mouth daily             ALLERGIES     Patient is is allergic to vicodin [hydrocodone-acetaminophen] and penicillins. Patients   There is no immunization history on file for this patient. FAMILY HISTORY     Patient's family history includes Arthritis in her maternal grandmother; Cancer in her maternal grandmother; Heart Disease in her maternal grandfather; Other in her mother. SOCIAL HISTORY     Patient  reports that she has been smoking cigarettes. She has a 5.00 pack-year smoking history. She has never used smokeless tobacco. She reports current alcohol use. She reports that she does not use drugs. PHYSICAL EXAM     ED TRIAGE VITALS  BP: (!) 151/88, Temp: 98.7 °F (37.1 °C), Heart Rate: 89, Resp: 18, SpO2: 99 %,Estimated body mass index is 36.15 kg/m² as calculated from the following:    Height as of 10/29/19: 5' 6\" (1.676 m). Weight as of this encounter: 224 lb (101.6 kg). ,Patient's last menstrual period was 10/28/2013. Physical Exam  Vitals and nursing note reviewed. Constitutional:       General: She is not in acute distress. Appearance: Normal appearance. She is not ill-appearing, toxic-appearing or diaphoretic. HENT:      Head: Normocephalic. Right Ear: Ear canal and external ear normal.      Left Ear: Ear canal and external ear normal.      Nose: Nose normal. No congestion or rhinorrhea.       Mouth/Throat:      Mouth: Mucous membranes are moist. Pharynx: Oropharynx is clear. No oropharyngeal exudate or posterior oropharyngeal erythema. Cardiovascular:      Rate and Rhythm: Normal rate. Pulses: Normal pulses. Pulmonary:      Effort: Pulmonary effort is normal. No respiratory distress. Breath sounds: No stridor. No wheezing or rhonchi. Abdominal:      General: Abdomen is flat. Bowel sounds are normal.      Palpations: Abdomen is soft. Musculoskeletal:         General: No swelling or tenderness. Normal range of motion. Cervical back: Normal range of motion. Neurological:      General: No focal deficit present. Mental Status: She is alert and oriented to person, place, and time. Psychiatric:         Mood and Affect: Mood normal.         Behavior: Behavior normal.       DIAGNOSTIC RESULTS     Labs:  Results for orders placed or performed during the hospital encounter of 07/25/22   COVID-19, Rapid   Result Value Ref Range    SARS-CoV-2, KRISTA DETECTED (AA) NOT DETECTED       IMAGING:    No orders to display         EKG:none      URGENT CARE COURSE:     Vitals:    07/25/22 1522   BP: (!) 151/88   Pulse: 89   Resp: 18   Temp: 98.7 °F (37.1 °C)   TempSrc: Temporal   SpO2: 99%   Weight: 224 lb (101.6 kg)       Medications - No data to display         PROCEDURES:  None    FINAL IMPRESSION      1. COVID-19          DISPOSITION/ PLAN     Patient seen and evaluated for the above symptoms. A rapid Covid swab was obtained and positive. He is instructed to isolate for 5 days from symptom onset. He is instructed to return to work on day 6 if symptoms are manageable and he is afebrile. He is instructed to wear a face covering for the next 5 days. He is provided a work note for 2 days with the current CDC recommendations for isolation. He is instructed to use over-the-counter Tylenol and Motrin for pain or fever. He is instructed to use zinc, vitamin C, vitamin D3, and Pepcid.   He is instructed to use Mucinex D, Flonase, and Zyrtec for symptom relief. He is instructed to follow-up with his PCP in 3 to 5 days with new or worsening symptoms. He is agreeable with the above plan and denies questions or concerns at this time.        PATIENT REFERRED TO:  KEDAR Avila CNP  [address] [facility] [address]      DISCHARGE MEDICATIONS:  Discharge Medication List as of 7/25/2022  3:41 PM          Discharge Medication List as of 7/25/2022  3:41 PM          Discharge Medication List as of 7/25/2022  3:41 PM          KEDAR Moreau CNP    (Please note that portions of this note were completed with a voice recognition program. Efforts were made to edit the dictations but occasionally words are mis-transcribed.)           KEDAR Moreau CNP  07/25/22 1110

## 2022-10-04 NOTE — ED PROVIDER NOTES
[facility]  Urgent Care Encounter       CHIEF COMPLAINT       Chief Complaint   Patient presents with    Diarrhea     x2 days    Flank Pain       Nurses Notes reviewed and I agree except as noted in the HPI. HISTORY OF PRESENT ILLNESS   Bhupinder Kramer is a 44 y.o. female who presents     The history is provided by the patient. Diarrhea   Quality:  Watery and semi-solid  Severity:  Mild  Onset quality:  Sudden  Number of episodes:  4 stools over past 24 hours  Timing:  Intermittent  Progression:  Unchanged  Relieved by:  None tried  Exacerbated by: eating. 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 The current episode started yesterday. The problem occurs rarely. The problem has not changed since onset. Associated symptoms include abdominal pain. Pertinent negatives include no headaches. Nothing aggravates the symptoms. Nothing relieves the symptoms. She has tried nothing for the symptoms. The treatment provided no relief. REVIEW OF SYSTEMS     Review of Systems   Constitutional: Positive for activity change, chills and fatigue. Negative for appetite change, diaphoresis and fever. HENT: Negative. Respiratory: Negative. Gastrointestinal: Positive for abdominal pain and diarrhea. Negative for vomiting. Genitourinary: Positive for flank pain.  Negative for difficulty urinating, Manual transmission of pacemaker and/or ICD, or implanted heart monitor shows normal cardiac device function. Patient's last device interrogation was on 8/29. Patient has a history of PVCs, pAF, and hypertrophic CM. Takes amiodarone, Eliquis, and verapamil. Since last device interrogation, daytime pauses recorded x 4 seconds. AT/AF burden measures 15.3%. Tachy event shows RVR at 222 bpm.    JANUSZ PENA to review. Please see the [employer] report under the Cardiology tab for more detail. kg).,Patient's last menstrual period was 10/28/2013. Physical Exam   Constitutional: She is oriented to person, place, and time. Vital signs are normal. She appears well-developed and well-nourished. She is cooperative. Non-toxic appearance. She does not have a sickly appearance. She does not appear ill. No distress. HENT:   Head: Normocephalic. Right Ear: Hearing, tympanic membrane, external ear and ear canal normal. No drainage, swelling or tenderness. No mastoid tenderness. Tympanic membrane is not erythematous. Left Ear: Hearing, tympanic membrane, external ear and ear canal normal. No drainage, swelling or tenderness. No mastoid tenderness. Tympanic membrane is not erythematous. Nose: Nose normal. Right sinus exhibits no maxillary sinus tenderness and no frontal sinus tenderness. Left sinus exhibits no maxillary sinus tenderness and no frontal sinus tenderness. Mouth/Throat: Uvula is midline, oropharynx is clear and moist and mucous membranes are normal. No oropharyngeal exudate, posterior oropharyngeal edema or posterior oropharyngeal erythema. Neck: Normal range of motion and full passive range of motion without pain. Neck supple. Cardiovascular: Normal rate, regular rhythm, S1 normal, S2 normal and normal heart sounds. Pulmonary/Chest: Effort normal and breath sounds normal. No accessory muscle usage. No respiratory distress. She has no decreased breath sounds. She has no wheezes. She has no rhonchi. She has no rales. She exhibits no tenderness. Abdominal: Soft. Normal appearance and bowel sounds are normal. She exhibits no shifting dullness, no distension, no pulsatile liver, no fluid wave, no abdominal bruit, no ascites, no pulsatile midline mass and no mass. There is no hepatosplenomegaly. There is tenderness in the periumbilical area. There is no rigidity, no rebound, no guarding, no CVA tenderness, no tenderness at McBurney's point and negative Messer's sign.    Lymphadenopathy: Head (right side): No submental, no submandibular, no tonsillar, no preauricular, no posterior auricular and no occipital adenopathy present. Head (left side): No submental, no submandibular, no tonsillar, no preauricular, no posterior auricular and no occipital adenopathy present. She has no cervical adenopathy. Right: No supraclavicular adenopathy present. Left: No supraclavicular adenopathy present. Neurological: She is alert and oriented to person, place, and time. Skin: Skin is warm and dry. She is not diaphoretic. Nursing note and vitals reviewed. DIAGNOSTIC RESULTS   Labs:  Results for orders placed or performed during the hospital encounter of 12/18/17   UA without Microscopic Reflex C&S   Result Value Ref Range    Glucose, Urine Negative NEGATIVE mg/dl    Bilirubin Urine Negative NEGATIVE    Ketones, Urine Negative NEGATIVE    Specific Gravity, UA 1.015 1.002 - 1.03    Blood, Urine Trace-lysed NEGATIVE    pH, UA 7.00 5.0 - 9.0    Protein, UA Negative NEGATIVE mg/dl    Urobilinogen, Urine 0.20 0.0 - 1.0 eu/dl    Nitrate, UA Negative NEGATIVE    LEUKOCYTES, UA Negative NEGATIVE    Color, UA Yellow STRAW-YELL    Character, Urine Clear CLEAR-SL C    REFLEX TO URINE C & S NOT INDICATED        IMAGING:    No orders to display         EKG:      URGENT CARE COURSE:     Vitals:    12/18/17 1338   BP: (!) 145/70   Pulse: 75   Resp: 18   Temp: 98.5 °F (36.9 °C)   TempSrc: Tympanic   SpO2: 99%   Weight: 200 lb (90.7 kg)   Height: 5' 6\" (1.676 m)       Medications - No data to display    ED Course        PROCEDURES:  None    FINAL IMPRESSION      1. Diarrhea, unspecified type          DISPOSITION/PLAN   DISPOSITION    The patient/Patient representative was advised to rest, drink lots of fluids, take Motrin and Tylenol for any fever, chills generalized body aches. The patient was also advised to monitor urine output for signs of dehydration.   If the patient develops any chest pain, shortness of breath, neck pain or stiffness or abdominal pain or any other concerns, the patient is to call 911 or go to the emergency department for further evaluation. If the patient does not experience any of the above symptoms he is to follow-up with his primary care provider in 2-3 days for reevaluation. The patient/Patient representative are agreeable to the treatment plan at this time. The patient left the urgent care center in stable condition.     PATIENT REFERRED TO:  Matilda Becker, [address] [place] [address]  [phone]    In 1 week  As needed, If symptoms worsen go to the Emergency Department      DISCHARGE MEDICATIONS:  Discharge Medication List as of 12/18/2017  2:12 PM          Discharge Medication List as of 12/18/2017  2:12 PM      STOP taking these medications       naproxen (NAPROSYN) 500 MG tablet Comments:   Reason for Stopping:         ondansetron (ZOFRAN) 4 MG tablet Comments:   Reason for Stopping:         ibuprofen (ADVIL;MOTRIN) 600 MG tablet Comments:   Reason for Stopping:               Discharge Medication List as of 12/18/2017  2:12 PM          Jared Quiroz NP    (Please note that portions of this note were completed with a voice recognition program.  Efforts were made to edit the dictations but occasionally words are mis-transcribed.)           Jared Quiroz NP  12/18/17 0990

## 2022-10-20 ENCOUNTER — HOSPITAL ENCOUNTER (EMERGENCY)
Age: 42
Discharge: HOME | End: 2022-10-20
Payer: MEDICARE

## 2022-10-20 ENCOUNTER — APPOINTMENT (OUTPATIENT)
Dept: GENERAL RADIOLOGY | Age: 42
End: 2022-10-20
Payer: MEDICARE

## 2022-10-20 VITALS
OXYGEN SATURATION: 98 % | RESPIRATION RATE: 16 BRPM | DIASTOLIC BLOOD PRESSURE: 81 MMHG | BODY MASS INDEX: 35.03 KG/M2 | SYSTOLIC BLOOD PRESSURE: 153 MMHG | WEIGHT: 218 LBS | HEART RATE: 72 BPM | TEMPERATURE: 97.5 F | HEIGHT: 66 IN

## 2022-10-20 DIAGNOSIS — S52.125A CLOSED NONDISPLACED FRACTURE OF HEAD OF LEFT RADIUS, INITIAL ENCOUNTER: Primary | ICD-10-CM

## 2022-10-20 PROCEDURE — 99213 OFFICE O/P EST LOW 20 MIN: CPT

## 2022-10-20 PROCEDURE — 73080 X-RAY EXAM OF ELBOW: CPT

## 2022-10-20 PROCEDURE — 99214 OFFICE O/P EST MOD 30 MIN: CPT | Performed by: NURSE PRACTITIONER

## 2022-10-20 RX ORDER — CYCLOBENZAPRINE HCL 10 MG
10 TABLET ORAL 3 TIMES DAILY PRN
COMMUNITY

## 2022-10-20 ASSESSMENT — PAIN DESCRIPTION - ORIENTATION: ORIENTATION: LEFT

## 2022-10-20 ASSESSMENT — PAIN SCALES - GENERAL: PAINLEVEL_OUTOF10: 10

## 2022-10-20 ASSESSMENT — ENCOUNTER SYMPTOMS
NAUSEA: 0
SHORTNESS OF BREATH: 0

## 2022-10-20 ASSESSMENT — PAIN DESCRIPTION - PAIN TYPE: TYPE: ACUTE PAIN

## 2022-10-20 ASSESSMENT — PAIN - FUNCTIONAL ASSESSMENT: PAIN_FUNCTIONAL_ASSESSMENT: 0-10

## 2022-10-20 ASSESSMENT — PAIN DESCRIPTION - LOCATION: LOCATION: ELBOW

## 2022-10-20 ASSESSMENT — PAIN DESCRIPTION - FREQUENCY: FREQUENCY: INTERMITTENT

## 2022-10-20 ASSESSMENT — PAIN DESCRIPTION - ONSET: ONSET: SUDDEN

## 2022-10-20 NOTE — ED PROVIDER NOTES
[facility]      CHIEF COMPLAINT       Chief Complaint   Patient presents with    Elbow Injury     Left, fell on it       Nurses Notes reviewed and I agree except as noted in the HPI. HISTORY OF PRESENT ILLNESS   Aaron Tavarez is a 36 y.o. female who presents for evaluation of left elbow pain. Onset of symptoms yesterday after patient tripped and fell, landed on left elbow. Pain is aching, continuous. Pain worse with movement, use and palpation. Rates 10/10 at times. No improvement with current treatment. REVIEW OF SYSTEMS     Review of Systems   Constitutional:  Negative for fatigue and fever. Respiratory:  Negative for shortness of breath. Cardiovascular:  Negative for chest pain. Gastrointestinal:  Negative for nausea. Musculoskeletal:  Positive for arthralgias. Negative for joint swelling, neck pain and neck stiffness. Skin:  Negative for rash and wound. Neurological:  Negative for weakness and numbness. Hematological:  Does not bruise/bleed easily. PAST MEDICAL HISTORY         Diagnosis Date    Anxiety     Depression     HPV (human papilloma virus) infection        SURGICAL HISTORY     Patient  has a past surgical history that includes laparoscopy; Myringotomy Tympanostomy Tube Placement; Tonsillectomy (1984); Tubal ligation (2002); and Hysterectomy (03/05/2014). CURRENT MEDICATIONS       Discharge Medication List as of 10/20/2022  7:44 PM        CONTINUE these medications which have NOT CHANGED    Details   cyclobenzaprine (FLEXERIL) 10 MG tablet Take 10 mg by mouth 3 times daily as needed for Muscle spasmsHistorical Med      QUEtiapine Fumarate (SEROQUEL PO) Take 50 mg by mouthHistorical Med      gabapentin (NEURONTIN) 100 MG capsule Take 100 mg by mouth in the morning and 100 mg at noon and 100 mg before bedtime. Historical Med      acetaminophen (TYLENOL) 325 MG tablet Take 650 mg by mouth every 6 hours as needed for PainHistorical Med      busPIRone (BUSPAR) 10 MG tablet Take 10 mg by mouth dailyHistorical Med      ibuprofen (ADVIL;MOTRIN) 200 MG tablet Take 800 mg by mouth every 6 hours as needed for PainHistorical Med      omeprazole (PRILOSEC) 20 MG delayed release capsule Take 1 capsule by mouth daily, Disp-30 capsule, R-0Print      cetirizine (ZYRTEC) 10 MG tablet Take 10 mg by mouth daily             ALLERGIES     Patient is is allergic to vicodin [hydrocodone-acetaminophen] and penicillins. FAMILY HISTORY     Patient'sfamily history includes Arthritis in her maternal grandmother; Cancer in her maternal grandmother; Heart Disease in her maternal grandfather; Other in her mother. SOCIAL HISTORY     Patient  reports that she has been smoking cigarettes. She has a 5.00 pack-year smoking history. She has never used smokeless tobacco. She reports current alcohol use. She reports that she does not use drugs. PHYSICAL EXAM     ED TRIAGE VITALS  BP: (!) 153/81, Temp: 97.5 °F (36.4 °C), Heart Rate: 72, Resp: 16, SpO2: 98 %  Physical Exam  Vitals and nursing note reviewed. Constitutional:       General: She is not in acute distress. Appearance: Normal appearance. HENT:      Head: Normocephalic and atraumatic. Right Ear: External ear normal.      Left Ear: External ear normal.      Nose: No congestion. Eyes:      General: No scleral icterus. Conjunctiva/sclera: Conjunctivae normal.   Cardiovascular:      Pulses:           Radial pulses are 2+ on the right side and 2+ on the left side. Pulmonary:      Effort: Pulmonary effort is normal. No respiratory distress. Musculoskeletal:      Right elbow: Normal.      Left elbow: No swelling or deformity. Normal range of motion. Tenderness present in radial head. Cervical back: Normal range of motion. Skin:     General: Skin is warm and dry. Capillary Refill: Capillary refill takes less than 2 seconds. Coloration: Skin is not jaundiced. Findings: No bruising, ecchymosis, erythema or rash. Neurological:      Mental Status: She is alert and oriented to person, place, and time. Sensory: Sensation is intact. Psychiatric:         Mood and Affect: Mood normal.         Behavior: Behavior normal. Behavior is cooperative. DIAGNOSTIC RESULTS   Labs:No results found for this visit on 10/20/22. IMAGING:  XR ELBOW LEFT (MIN 3 VIEWS)   Final Result   1. Acute nondisplaced intra-articular fracture of radial head. 2. Moderate elbow joint effusion. This document has been electronically signed by: Kvng Robertson MD on 10/20/2022    07:38 PM         URGENT CARE COURSE:     Vitals:    10/20/22 1917   BP: (!) 153/81   Pulse: 72   Resp: 16   Temp: 97.5 °F (36.4 °C)   TempSrc: Temporal   SpO2: 98%   Weight: 218 lb (98.9 kg)   Height: 5' 6\" (1.676 m)       Medications - No data to display  PROCEDURES:  None  FINAL IMPRESSION      1. Closed nondisplaced fracture of head of left radius, initial encounter        DISPOSITION/PLAN   DISPOSITION Decision To Discharge 10/20/2022 07:44:05 PM    X-ray consistent with nondisplaced fracture of the radial head. Patient to follow-up with Ortho tomorrow. Placed in sling. Over-the-counter meds for pain. Apply ice. If any distress go to ER. PATIENT REFERRED TO:  Breann Hamm 92  [address] [facility], [place], operated by [facility] [phone]    Follow up at [facility] walk in. M-R 8-4. F 8-3. Rest, ice, elevation>  OTC med for pain. if worse go to ER.     DISCHARGE MEDICATIONS:  Discharge Medication List as of 10/20/2022  7:44 PM        Discharge Medication List as of 10/20/2022  7:44 PM          [address] [facility], APRN - CNP  10/20/22 2002

## 2022-10-20 NOTE — ED NOTES
Pt presents to [facility] for c/o left elbow pain after a fall 1 day prior     Cricket Yusuf LPN  76/71/44 [id number]

## 2022-12-19 ENCOUNTER — HOSPITAL ENCOUNTER (EMERGENCY)
Age: 42
Discharge: HOME | End: 2022-12-19
Payer: MEDICARE

## 2022-12-19 VITALS
OXYGEN SATURATION: 100 % | HEART RATE: 118 BPM | TEMPERATURE: 98 F | RESPIRATION RATE: 20 BRPM | SYSTOLIC BLOOD PRESSURE: 165 MMHG | DIASTOLIC BLOOD PRESSURE: 77 MMHG

## 2022-12-19 DIAGNOSIS — R80.9 PROTEINURIA, UNSPECIFIED TYPE: Primary | ICD-10-CM

## 2022-12-19 LAB
BILIRUBIN URINE: ABNORMAL
BLOOD, URINE: ABNORMAL
CHARACTER, URINE: ABNORMAL
COLOR: ABNORMAL
FLU A ANTIGEN: NEGATIVE
GLUCOSE URINE: NEGATIVE MG/DL
INFLUENZA B AG, EIA: NEGATIVE
KETONES, URINE: 15
LEUKOCYTE ESTERASE, URINE: NEGATIVE
NITRITE, URINE: NEGATIVE
PH, URINE: 6 (ref 5–9)
PROTEIN, URINE: 100 MG/DL
SPECIFIC GRAVITY, URINE: >= 1.03 (ref 1–1.03)
UROBILINOGEN, URINE: 0.2 EU/DL (ref 0.2–1)

## 2022-12-19 PROCEDURE — 81003 URINALYSIS AUTO W/O SCOPE: CPT

## 2022-12-19 PROCEDURE — 87804 INFLUENZA ASSAY W/OPTIC: CPT

## 2022-12-19 PROCEDURE — 87086 URINE CULTURE/COLONY COUNT: CPT

## 2022-12-19 PROCEDURE — 6370000000 HC RX 637 (ALT 250 FOR IP): Performed by: NURSE PRACTITIONER

## 2022-12-19 PROCEDURE — 99213 OFFICE O/P EST LOW 20 MIN: CPT

## 2022-12-19 PROCEDURE — 99212 OFFICE O/P EST SF 10 MIN: CPT | Performed by: NURSE PRACTITIONER

## 2022-12-19 RX ORDER — CEFDINIR 300 MG/1
300 CAPSULE ORAL 2 TIMES DAILY
Qty: 6 CAPSULE | Refills: 0 | Status: SHIPPED | OUTPATIENT
Start: 2022-12-19 | End: 2022-12-22

## 2022-12-19 RX ORDER — ONDANSETRON 4 MG/1
4 TABLET, ORALLY DISINTEGRATING ORAL ONCE
Status: COMPLETED | OUTPATIENT
Start: 2022-12-19 | End: 2022-12-19

## 2022-12-19 RX ORDER — TRAMADOL HYDROCHLORIDE 50 MG/1
TABLET ORAL
COMMUNITY
Start: 2022-10-21

## 2022-12-19 RX ORDER — ONDANSETRON 4 MG/1
4 TABLET, FILM COATED ORAL EVERY 8 HOURS PRN
Qty: 9 TABLET | Refills: 0 | Status: SHIPPED | OUTPATIENT
Start: 2022-12-19 | End: 2022-12-22

## 2022-12-19 RX ADMIN — ONDANSETRON 4 MG: 4 TABLET, ORALLY DISINTEGRATING ORAL at 13:30

## 2022-12-19 ASSESSMENT — ENCOUNTER SYMPTOMS
CHOKING: 0
SORE THROAT: 0
ABDOMINAL PAIN: 0
NAUSEA: 1
COUGH: 1
DIARRHEA: 0
CHEST TIGHTNESS: 0
STRIDOR: 0
VOMITING: 1
SHORTNESS OF BREATH: 0
APNEA: 0

## 2022-12-19 NOTE — ED PROVIDER NOTES
[facility]      CHIEF COMPLAINT       Chief Complaint   Patient presents with    Cough    Nausea    Generalized Body Aches       Nurses Notes reviewed and I agree except as noted in the HPI. HISTORY OFPRESENT ILLNESS   Hill Oscar is a 36 y.o. The history is provided by the patient. No  was used. Nausea & Vomiting  Severity:  Severe  Duration:  1 day  Timing:  Constant  Quality:  Undigested food and stomach contents  Progression:  Unchanged  Recent urination:  Normal  Context: not post-tussive and not self-induced    Relieved by:  Nothing  Worsened by:  Nothing  Ineffective treatments:  None tried  Associated symptoms: cough, headaches and myalgias    Associated symptoms: no abdominal pain, no arthralgias, no chills, no diarrhea, no fever, no sore throat and no URI    Risk factors: sick contacts    Risk factors: no alcohol use, no diabetes, not pregnant, no prior abdominal surgery, no suspect food intake and no travel to endemic areas      REVIEW OF SYSTEMS     Review of Systems   Constitutional:  Negative for activity change, appetite change, chills, diaphoresis, fatigue and fever. HENT:  Negative for sore throat. Respiratory:  Positive for cough. Negative for apnea, choking, chest tightness, shortness of breath and stridor. Cardiovascular:  Negative for chest pain, palpitations and leg swelling. Gastrointestinal:  Positive for nausea and vomiting. Negative for abdominal pain and diarrhea. Genitourinary:  Positive for frequency. Musculoskeletal:  Positive for myalgias. Negative for arthralgias. Neurological:  Positive for headaches. PAST MEDICAL HISTORY         Diagnosis Date    Anxiety     Depression     HPV (human papilloma virus) infection        SURGICAL HISTORY     Patient  has a past surgical history that includes laparoscopy; Myringotomy Tympanostomy Tube Placement; Tonsillectomy (1984);  Tubal ligation (2002); and Hysterectomy (03/05/2014). CURRENT MEDICATIONS       Discharge Medication List as of 12/19/2022  1:37 PM        CONTINUE these medications which have NOT CHANGED    Details   traMADol (ULTRAM) 50 MG tablet TAKE 1 TABLET BY MOUTH EVERY 6 HOURS AS NEEDED FOR PAIN FOR 7 DAYSHistorical Med      cyclobenzaprine (FLEXERIL) 10 MG tablet Take 10 mg by mouth 3 times daily as needed for Muscle spasmsHistorical Med      QUEtiapine Fumarate (SEROQUEL PO) Take 50 mg by mouthHistorical Med      gabapentin (NEURONTIN) 100 MG capsule Take 100 mg by mouth in the morning and 100 mg at noon and 100 mg before bedtime. Historical Med      acetaminophen (TYLENOL) 325 MG tablet Take 650 mg by mouth every 6 hours as needed for PainHistorical Med      busPIRone (BUSPAR) 10 MG tablet Take 10 mg by mouth dailyHistorical Med      ibuprofen (ADVIL;MOTRIN) 200 MG tablet Take 800 mg by mouth every 6 hours as needed for PainHistorical Med      omeprazole (PRILOSEC) 20 MG delayed release capsule Take 1 capsule by mouth daily, Disp-30 capsule, R-0Print      cetirizine (ZYRTEC) 10 MG tablet Take 10 mg by mouth daily             ALLERGIES     Patient is is allergic to vicodin [hydrocodone-acetaminophen] and penicillins. FAMILY HISTORY     Patient's family history includes Arthritis in her maternal grandmother; Cancer in her maternal grandmother; Heart Disease in her maternal grandfather; Other in her mother. SOCIAL HISTORY     Patient  reports that she has been smoking cigarettes. She has a 5.00 pack-year smoking history. She has never used smokeless tobacco. She reports current alcohol use. She reports that she does not use drugs. PHYSICAL EXAM     ED TRIAGE VITALS  BP: (!) 165/77, Temp: 98 °F (36.7 °C), Heart Rate: (!) 118, Resp: 20, SpO2: 100 %  Physical Exam  Vitals and nursing note reviewed. Constitutional:       General: She is not in acute distress. Appearance: Normal appearance. She is obese.  She is not ill-appearing, toxic-appearing or diaphoretic. HENT:      Head: Normocephalic and atraumatic. Right Ear: External ear normal.      Left Ear: External ear normal.   Eyes:      Extraocular Movements: Extraocular movements intact. Conjunctiva/sclera: Conjunctivae normal.   Pulmonary:      Effort: Pulmonary effort is normal.   Musculoskeletal:         General: Normal range of motion. Cervical back: Normal range of motion. Skin:     General: Skin is warm. Neurological:      General: No focal deficit present. Mental Status: She is alert and oriented to person, place, and time. Psychiatric:         Mood and Affect: Mood normal.         Behavior: Behavior normal.         Thought Content: Thought content normal.         Judgment: Judgment normal.       DIAGNOSTIC RESULTS   Labs:  Results for orders placed or performed during the hospital encounter of 12/19/22   Rapid influenza A/B antigens   Result Value Ref Range    Flu A Antigen Negative NEGATIVE    Influenza B Ag, EIA Negative NEGATIVE   Urinalysis   Result Value Ref Range    Glucose, Ur Negative NEGATIVE mg/dl    Bilirubin Urine Small (A) NEGATIVE    Ketones, Urine 15 NEGATIVE    Specific Gravity, Urine >= 1.030 1.002 - 1.030    Blood, Urine Moderate (A) NEGATIVE    pH, Urine 6.00 5.0 - 9.0    Protein, Urine 100 (A) NEGATIVE mg/dl    Urobilinogen, Urine 0.20 0.2 - 1.0 eu/dl    Nitrite, Urine Negative NEGATIVE    Leukocyte Esterase, Urine Negative NEGATIVE    Color, UA Dark yellow (A) STRAW-YELLOW    Character, Urine Slightly Cloudy CLEAR-SL CLOUD       IMAGING:  No orders to display     URGENT CARE COURSE:     Vitals:    12/19/22 1258   BP: (!) 165/77   Pulse: (!) 118   Resp: 20   Temp: 98 °F (36.7 °C)   SpO2: 100%       Medications   ondansetron (ZOFRAN-ODT) disintegrating tablet 4 mg (4 mg Oral Given 12/19/22 1330)     PROCEDURES:  None  FINAL IMPRESSION      1.  Proteinuria, unspecified type        DISPOSITION/PLAN   Decision To Discharge These symptoms are consistent with viral gastroenteritis. I recommend Clear Liquids only next 12-24 hours. If tolerated then BRAT Diet . Advise the patient that if worsening symptoms such as more than 6 stools per day, not voiding regularly, persistent vomiting not relieved with medication,unable to take oral fluids, high fever, severe weakness, lightheadedness or fainting, dry mucous membranes or other signs of dehydration, persisting or increasing abdominal pain, blood in stool or vomit, or failure to improve in 1-2 days. The patient needs to be reevaluated at that time by their primary care provider for a recheck, OR go the Emergency Department for reevaluation. The patient or patient's representative are agreeable to the treatment plan and left ambulatory without any complaints at this time.         PATIENT REFERRED TO:  KEDAR Vee CNP  [address]  [phone]    Schedule an appointment as soon as possible for a visit     DISCHARGE MEDICATIONS:  Discharge Medication List as of 12/19/2022  1:37 PM        START taking these medications    Details   ondansetron (ZOFRAN) 4 MG tablet Take 1 tablet by mouth every 8 hours as needed for Nausea, Disp-9 tablet, R-0Normal      cefdinir (OMNICEF) 300 MG capsule Take 1 capsule by mouth 2 times daily for 3 days, Disp-6 capsule, R-0Normal           Discharge Medication List as of 12/19/2022  1:37 PM          Jeane Lennox, APRN - CNP Jeane Lennox, APRN - CNP  12/19/22 7713

## 2022-12-19 NOTE — Clinical Note
Domingo Chandler was seen and treated in our emergency department on 12/19/2022. She may return to work on 12/20/2022. If you have any questions or concerns, please don't hesitate to call.       Valery Doyle, APRN - CNP

## 2022-12-21 LAB
ORGANISM: ABNORMAL
URINE CULTURE, ROUTINE: ABNORMAL

## 2022-12-24 ENCOUNTER — HOSPITAL ENCOUNTER (EMERGENCY)
Age: 42
Discharge: HOME | End: 2022-12-24
Payer: MEDICARE

## 2022-12-24 VITALS
HEART RATE: 78 BPM | DIASTOLIC BLOOD PRESSURE: 85 MMHG | TEMPERATURE: 99.2 F | SYSTOLIC BLOOD PRESSURE: 136 MMHG | RESPIRATION RATE: 16 BRPM | BODY MASS INDEX: 35.51 KG/M2 | OXYGEN SATURATION: 96 % | WEIGHT: 220 LBS

## 2022-12-24 DIAGNOSIS — J40 BRONCHITIS: ICD-10-CM

## 2022-12-24 DIAGNOSIS — J10.1 INFLUENZA A: Primary | ICD-10-CM

## 2022-12-24 PROCEDURE — 99213 OFFICE O/P EST LOW 20 MIN: CPT

## 2022-12-24 RX ORDER — PREDNISONE 20 MG/1
20 TABLET ORAL 2 TIMES DAILY
Qty: 14 TABLET | Refills: 0 | Status: SHIPPED | OUTPATIENT
Start: 2022-12-24 | End: 2022-12-31

## 2022-12-24 RX ORDER — DEXTROMETHORPHAN HYDROBROMIDE AND PROMETHAZINE HYDROCHLORIDE 15; 6.25 MG/5ML; MG/5ML
5 SYRUP ORAL 4 TIMES DAILY PRN
Qty: 150 ML | Refills: 0 | Status: SHIPPED | OUTPATIENT
Start: 2022-12-24

## 2022-12-24 RX ORDER — AZITHROMYCIN 250 MG/1
TABLET, FILM COATED ORAL
COMMUNITY
Start: 2022-12-22

## 2022-12-24 RX ORDER — ALBUTEROL SULFATE 2.5 MG/3ML
2.5 SOLUTION RESPIRATORY (INHALATION) EVERY 6 HOURS PRN
Qty: 120 EACH | Refills: 0 | Status: SHIPPED | OUTPATIENT
Start: 2022-12-24

## 2022-12-24 ASSESSMENT — ENCOUNTER SYMPTOMS
COUGH: 1
ABDOMINAL PAIN: 0
CONSTIPATION: 0
SINUS PAIN: 1
WHEEZING: 1
SHORTNESS OF BREATH: 0
CHEST TIGHTNESS: 1
EYE REDNESS: 0
EYE PAIN: 0
ABDOMINAL DISTENTION: 0
SINUS PRESSURE: 1
DIARRHEA: 0

## 2022-12-24 ASSESSMENT — PAIN - FUNCTIONAL ASSESSMENT: PAIN_FUNCTIONAL_ASSESSMENT: NONE - DENIES PAIN

## 2022-12-24 NOTE — Clinical Note
Krishan Yan was seen and treated in our emergency department on 12/24/2022. She may return to work on 12/26/2022. If you have any questions or concerns, please don't hesitate to call.       Trinidad Mae, KEDAR - CNP

## 2022-12-24 NOTE — ED PROVIDER NOTES
[address]       Chief Complaint   Patient presents with    Cough    Fatigue    Other     Needs a Dr note,  was seen last Monday and has Influenza A. Needs an extended work note due to the fatigue and cough       Nurses Notes reviewed and I agree except as noted in the HPI. HISTORY OF PRESENT ILLNESS   Von Jackson is a 36 y.o. female who presents ongoing influenza symptoms x 1 week. Cough, chest congestion, large amount of phlegm, fever, poor activity tolerance. Has been using her inhaler and this has not helped. REVIEW OF SYSTEMS     Review of Systems   Constitutional:  Positive for activity change, appetite change and fatigue. Negative for unexpected weight change. HENT:  Positive for sinus pressure and sinus pain. Negative for ear discharge, ear pain, nosebleeds and tinnitus. Eyes:  Negative for pain and redness. Respiratory:  Positive for cough, chest tightness and wheezing. Negative for shortness of breath. Cardiovascular:  Negative for chest pain. Gastrointestinal:  Negative for abdominal distention, abdominal pain, constipation and diarrhea. Endocrine: Negative for cold intolerance and heat intolerance. Genitourinary:  Negative for difficulty urinating and dysuria. Musculoskeletal:  Positive for arthralgias and myalgias. Skin:  Negative for pallor and rash. Allergic/Immunologic: Negative for environmental allergies, food allergies and immunocompromised state. Neurological:  Positive for light-headedness and headaches. Negative for dizziness, weakness and numbness. Hematological:  Negative for adenopathy. Does not bruise/bleed easily. Psychiatric/Behavioral:  Negative for behavioral problems and decreased concentration. The patient is not nervous/anxious. All other systems reviewed and are negative.     PAST MEDICAL HISTORY         Diagnosis Date    Anxiety     Depression     HPV (human papilloma virus) infection        SURGICAL HISTORY     Patient  has a past surgical history that includes laparoscopy; Myringotomy Tympanostomy Tube Placement; Tonsillectomy (1984); Tubal ligation (2002); and Hysterectomy (03/05/2014). CURRENT MEDICATIONS       Discharge Medication List as of 12/24/2022 11:35 AM        CONTINUE these medications which have NOT CHANGED    Details   azithromycin (ZITHROMAX) 250 MG tablet Historical Med      VENTOLIN  (90 Base) MCG/ACT inhaler DAWHistorical Med      traMADol (ULTRAM) 50 MG tablet TAKE 1 TABLET BY MOUTH EVERY 6 HOURS AS NEEDED FOR PAIN FOR 7 DAYSHistorical Med      cyclobenzaprine (FLEXERIL) 10 MG tablet Take 10 mg by mouth 3 times daily as needed for Muscle spasmsHistorical Med      QUEtiapine Fumarate (SEROQUEL PO) Take 50 mg by mouthHistorical Med      gabapentin (NEURONTIN) 100 MG capsule Take 100 mg by mouth in the morning and 100 mg at noon and 100 mg before bedtime. Historical Med      acetaminophen (TYLENOL) 325 MG tablet Take 650 mg by mouth every 6 hours as needed for PainHistorical Med      busPIRone (BUSPAR) 10 MG tablet Take 10 mg by mouth dailyHistorical Med      ibuprofen (ADVIL;MOTRIN) 200 MG tablet Take 800 mg by mouth every 6 hours as needed for PainHistorical Med      omeprazole (PRILOSEC) 20 MG delayed release capsule Take 1 capsule by mouth daily, Disp-30 capsule, R-0Print      cetirizine (ZYRTEC) 10 MG tablet Take 10 mg by mouth daily             ALLERGIES     Patient is is allergic to vicodin [hydrocodone-acetaminophen] and penicillins. FAMILY HISTORY     Patient'sfamily history includes Arthritis in her maternal grandmother; Cancer in her maternal grandmother; Heart Disease in her maternal grandfather; Other in her mother. SOCIAL HISTORY     Patient  reports that she has been smoking cigarettes. She has a 5.00 pack-year smoking history. She has never used smokeless tobacco. She reports current alcohol use.  She reports that she does not use drugs.    PHYSICAL EXAM     ED TRIAGE VITALS  BP: 136/85, Temp: 99.2 °F (37.3 °C), Heart Rate: 78, Resp: 16, SpO2: 96 %  Physical Exam  Vitals and nursing note reviewed. Constitutional:       Appearance: She is normal weight. She is ill-appearing. HENT:      Head: Normocephalic. Right Ear: Ear canal and external ear normal.      Left Ear: Ear canal and external ear normal.      Nose: Congestion and rhinorrhea present. Mouth/Throat:      Mouth: Mucous membranes are dry. Pharynx: Posterior oropharyngeal erythema present. Eyes:      Conjunctiva/sclera: Conjunctivae normal.   Cardiovascular:      Rate and Rhythm: Normal rate and regular rhythm. Pulses: Normal pulses. Heart sounds: Normal heart sounds. Pulmonary:      Effort: Pulmonary effort is normal.      Breath sounds: Wheezing (diffuse throughout) and rhonchi present. Comments: Moist productive cough. Abdominal:      General: Abdomen is flat. Palpations: Abdomen is soft. Musculoskeletal:         General: Normal range of motion. Cervical back: Normal range of motion and neck supple. Tenderness present. Lymphadenopathy:      Cervical: No cervical adenopathy. Skin:     General: Skin is warm and dry. Capillary Refill: Capillary refill takes less than 2 seconds. Coloration: Skin is pale. Neurological:      General: No focal deficit present. Mental Status: She is alert and oriented to person, place, and time. Mental status is at baseline. Psychiatric:         Mood and Affect: Mood normal.         Behavior: Behavior normal.         Thought Content: Thought content normal.       DIAGNOSTIC RESULTS   Labs:No results found for this visit on 12/24/22. IMAGING:  No orders to display     URGENT CARE COURSE:         Medications - No data to display  PROCEDURES:  FINALIMPRESSION    I have reviewed the patient's medical history in detail and updated the computerized patient record.   HPI/ROS per the patient and caregiver. Overall non toxic in appearance. Answers questions appropriately. Conditions discussed and addressed this visit include:   Patient appears ill but non-toxic and fairly well hydrated. Encouraged nebulizer use at this time. Start steroid. Use a cool mist humidifier at home. Patient is to take medications as directed. Continue all home medications. Potential side effects of the medications were reviewed with the patient in detail. The patient can increase fluids. The patient can have Tylenol or Motrin for pain and fever as needed. Discussed with patient signs and symptoms that would require emergent evaluation and treatment. The patient will follow-up with their family physician or go to the ER if they develop any worsening symptoms. The patient was discharged in stable condition      1. Influenza A    2.  Bronchitis        DISPOSITION/PLAN   DISPOSITION Decision To Discharge 12/24/2022 11:33:51 AM    PATIENT REFERRED TO:  KEDAR Dominguez CNP  [address]  [phone]    In 3 days  As needed, If symptoms worsen  DISCHARGE MEDICATIONS:  Discharge Medication List as of 12/24/2022 11:35 AM        START taking these medications    Details   promethazine-dextromethorphan (PROMETHAZINE-DM) 6.25-15 MG/5ML syrup Take 5 mLs by mouth 4 times daily as needed for Cough, Disp-150 mL, R-0Normal      predniSONE (DELTASONE) 20 MG tablet Take 1 tablet by mouth 2 times daily for 7 days, Disp-14 tablet, R-0Normal           Discharge Medication List as of 12/24/2022 11:35 AM          KEDAR Queen CNP, APRN - CNP  12/24/22 0936

## 2025-08-04 SDOH — HEALTH STABILITY: PHYSICAL HEALTH: ON AVERAGE, HOW MANY DAYS PER WEEK DO YOU ENGAGE IN MODERATE TO STRENUOUS EXERCISE (LIKE A BRISK WALK)?: 3 DAYS

## 2025-08-04 SDOH — HEALTH STABILITY: PHYSICAL HEALTH: ON AVERAGE, HOW MANY MINUTES DO YOU ENGAGE IN EXERCISE AT THIS LEVEL?: 30 MIN

## 2025-08-05 ENCOUNTER — OFFICE VISIT (OUTPATIENT)
Dept: FAMILY MEDICINE CLINIC | Age: 45
End: 2025-08-05
Payer: COMMERCIAL

## 2025-08-05 VITALS
WEIGHT: 235.2 LBS | HEIGHT: 67 IN | TEMPERATURE: 97.8 F | DIASTOLIC BLOOD PRESSURE: 84 MMHG | BODY MASS INDEX: 36.91 KG/M2 | HEART RATE: 75 BPM | SYSTOLIC BLOOD PRESSURE: 132 MMHG | RESPIRATION RATE: 16 BRPM | OXYGEN SATURATION: 96 %

## 2025-08-05 DIAGNOSIS — F32.A DEPRESSION, UNSPECIFIED DEPRESSION TYPE: ICD-10-CM

## 2025-08-05 DIAGNOSIS — Z12.31 ENCOUNTER FOR SCREENING MAMMOGRAM FOR BREAST CANCER: ICD-10-CM

## 2025-08-05 DIAGNOSIS — F31.9 BIPOLAR DEPRESSION (HCC): ICD-10-CM

## 2025-08-05 DIAGNOSIS — G89.29 CHRONIC LOW BACK PAIN, UNSPECIFIED BACK PAIN LATERALITY, UNSPECIFIED WHETHER SCIATICA PRESENT: ICD-10-CM

## 2025-08-05 DIAGNOSIS — J45.20 MILD INTERMITTENT ASTHMA WITHOUT COMPLICATION: ICD-10-CM

## 2025-08-05 DIAGNOSIS — B97.7 HPV (HUMAN PAPILLOMA VIRUS) INFECTION: ICD-10-CM

## 2025-08-05 DIAGNOSIS — G62.9 NEUROPATHY: ICD-10-CM

## 2025-08-05 DIAGNOSIS — M54.50 CHRONIC LOW BACK PAIN, UNSPECIFIED BACK PAIN LATERALITY, UNSPECIFIED WHETHER SCIATICA PRESENT: ICD-10-CM

## 2025-08-05 DIAGNOSIS — G47.9 SLEEP DISTURBANCE: ICD-10-CM

## 2025-08-05 DIAGNOSIS — F41.9 ANXIETY: ICD-10-CM

## 2025-08-05 DIAGNOSIS — Z76.89 ENCOUNTER TO ESTABLISH CARE: Primary | ICD-10-CM

## 2025-08-05 DIAGNOSIS — N80.9 ENDOMETRIOSIS: ICD-10-CM

## 2025-08-05 DIAGNOSIS — R31.9 HEMATURIA, UNSPECIFIED TYPE: ICD-10-CM

## 2025-08-05 LAB
BACTERIA: ABNORMAL
BILIRUB UR QL STRIP: NEGATIVE
BILIRUBIN, POC: NEGATIVE
BLOOD URINE, POC: NORMAL
CASTS #/AREA URNS LPF: ABNORMAL /LPF
CASTS #/AREA URNS LPF: ABNORMAL /LPF
CHARACTER UR: CLEAR
CHARCOAL URNS QL MICRO: ABNORMAL
CLARITY, POC: CLEAR
COLOR UR: YELLOW
COLOR, POC: YELLOW
CRYSTALS URNS QL MICRO: ABNORMAL
EPITHELIAL CELLS, UA: ABNORMAL /HPF
GLUCOSE UR QL STRIP.AUTO: NEGATIVE MG/DL
GLUCOSE URINE, POC: NEGATIVE MG/DL
HGB UR QL STRIP.AUTO: NEGATIVE
KETONES UR QL STRIP.AUTO: ABNORMAL
KETONES, POC: NEGATIVE MG/DL
LEUKOCYTE EST, POC: NEGATIVE
LEUKOCYTE ESTERASE UR QL STRIP.AUTO: NEGATIVE
NITRITE UR QL STRIP.AUTO: NEGATIVE
NITRITE, POC: NEGATIVE
PH UR STRIP.AUTO: 6.5 [PH] (ref 5–9)
PH, POC: 6.5
PROT UR STRIP.AUTO-MCNC: NEGATIVE MG/DL
PROTEIN, POC: NEGATIVE MG/DL
RBC #/AREA URNS HPF: ABNORMAL /HPF
RENAL EPI CELLS #/AREA URNS HPF: ABNORMAL /[HPF]
SPECIFIC GRAVITY UA: 1.02 (ref 1–1.03)
SPECIFIC GRAVITY, POC: >=1.03
UROBILINOGEN, POC: 0.2 MG/DL
UROBILINOGEN, URINE: 0.2 EU/DL (ref 0–1)
WBC #/AREA URNS HPF: ABNORMAL /HPF
YEAST LIKE FUNGI URNS QL MICRO: ABNORMAL

## 2025-08-05 PROCEDURE — G8427 DOCREV CUR MEDS BY ELIG CLIN: HCPCS | Performed by: NURSE PRACTITIONER

## 2025-08-05 PROCEDURE — 81003 URINALYSIS AUTO W/O SCOPE: CPT | Performed by: NURSE PRACTITIONER

## 2025-08-05 PROCEDURE — G8417 CALC BMI ABV UP PARAM F/U: HCPCS | Performed by: NURSE PRACTITIONER

## 2025-08-05 PROCEDURE — 99204 OFFICE O/P NEW MOD 45 MIN: CPT | Performed by: NURSE PRACTITIONER

## 2025-08-05 PROCEDURE — 4004F PT TOBACCO SCREEN RCVD TLK: CPT | Performed by: NURSE PRACTITIONER

## 2025-08-05 RX ORDER — LAMOTRIGINE 25 MG/1
25 TABLET ORAL DAILY
Qty: 30 TABLET | Refills: 3 | Status: SHIPPED | OUTPATIENT
Start: 2025-08-05

## 2025-08-05 RX ORDER — ALBUTEROL SULFATE 90 UG/1
2 INHALANT RESPIRATORY (INHALATION) EVERY 4 HOURS PRN
Qty: 18 G | Refills: 2 | Status: SHIPPED | OUTPATIENT
Start: 2025-08-05

## 2025-08-05 RX ORDER — GABAPENTIN 100 MG/1
100 CAPSULE ORAL 3 TIMES DAILY
Qty: 270 CAPSULE | Refills: 1 | Status: SHIPPED | OUTPATIENT
Start: 2025-08-05 | End: 2026-02-01

## 2025-08-05 ASSESSMENT — PATIENT HEALTH QUESTIONNAIRE - PHQ9
2. FEELING DOWN, DEPRESSED OR HOPELESS: MORE THAN HALF THE DAYS
SUM OF ALL RESPONSES TO PHQ QUESTIONS 1-9: 11
5. POOR APPETITE OR OVEREATING: SEVERAL DAYS
4. FEELING TIRED OR HAVING LITTLE ENERGY: NEARLY EVERY DAY
3. TROUBLE FALLING OR STAYING ASLEEP: NEARLY EVERY DAY
7. TROUBLE CONCENTRATING ON THINGS, SUCH AS READING THE NEWSPAPER OR WATCHING TELEVISION: NOT AT ALL
1. LITTLE INTEREST OR PLEASURE IN DOING THINGS: MORE THAN HALF THE DAYS
SUM OF ALL RESPONSES TO PHQ QUESTIONS 1-9: 11
10. IF YOU CHECKED OFF ANY PROBLEMS, HOW DIFFICULT HAVE THESE PROBLEMS MADE IT FOR YOU TO DO YOUR WORK, TAKE CARE OF THINGS AT HOME, OR GET ALONG WITH OTHER PEOPLE: SOMEWHAT DIFFICULT
6. FEELING BAD ABOUT YOURSELF - OR THAT YOU ARE A FAILURE OR HAVE LET YOURSELF OR YOUR FAMILY DOWN: NOT AT ALL
8. MOVING OR SPEAKING SO SLOWLY THAT OTHER PEOPLE COULD HAVE NOTICED. OR THE OPPOSITE, BEING SO FIGETY OR RESTLESS THAT YOU HAVE BEEN MOVING AROUND A LOT MORE THAN USUAL: NOT AT ALL
SUM OF ALL RESPONSES TO PHQ QUESTIONS 1-9: 11
SUM OF ALL RESPONSES TO PHQ QUESTIONS 1-9: 11
9. THOUGHTS THAT YOU WOULD BE BETTER OFF DEAD, OR OF HURTING YOURSELF: NOT AT ALL

## 2025-08-06 ENCOUNTER — RESULTS FOLLOW-UP (OUTPATIENT)
Dept: FAMILY MEDICINE CLINIC | Age: 45
End: 2025-08-06

## 2025-08-07 LAB
BACTERIA UR CULT: ABNORMAL
ORGANISM: ABNORMAL

## 2025-08-12 ENCOUNTER — LAB (OUTPATIENT)
Dept: LAB | Age: 45
End: 2025-08-12

## 2025-08-12 ENCOUNTER — TELEPHONE (OUTPATIENT)
Dept: FAMILY MEDICINE CLINIC | Age: 45
End: 2025-08-12

## 2025-08-12 ENCOUNTER — HOSPITAL ENCOUNTER (OUTPATIENT)
Dept: WOMENS IMAGING | Age: 45
Discharge: HOME | End: 2025-08-12
Payer: COMMERCIAL

## 2025-08-12 DIAGNOSIS — F41.9 ANXIETY: ICD-10-CM

## 2025-08-12 DIAGNOSIS — Z12.31 ENCOUNTER FOR SCREENING MAMMOGRAM FOR BREAST CANCER: ICD-10-CM

## 2025-08-12 DIAGNOSIS — N80.9 ENDOMETRIOSIS: ICD-10-CM

## 2025-08-12 DIAGNOSIS — E78.2 MIXED HYPERLIPIDEMIA: Primary | ICD-10-CM

## 2025-08-12 DIAGNOSIS — M54.50 CHRONIC LOW BACK PAIN, UNSPECIFIED BACK PAIN LATERALITY, UNSPECIFIED WHETHER SCIATICA PRESENT: ICD-10-CM

## 2025-08-12 DIAGNOSIS — R31.9 HEMATURIA, UNSPECIFIED TYPE: ICD-10-CM

## 2025-08-12 DIAGNOSIS — G62.9 NEUROPATHY: ICD-10-CM

## 2025-08-12 DIAGNOSIS — F32.A DEPRESSION, UNSPECIFIED DEPRESSION TYPE: ICD-10-CM

## 2025-08-12 DIAGNOSIS — B97.7 HPV (HUMAN PAPILLOMA VIRUS) INFECTION: ICD-10-CM

## 2025-08-12 DIAGNOSIS — G89.29 CHRONIC LOW BACK PAIN, UNSPECIFIED BACK PAIN LATERALITY, UNSPECIFIED WHETHER SCIATICA PRESENT: ICD-10-CM

## 2025-08-12 DIAGNOSIS — F31.9 BIPOLAR DEPRESSION (HCC): ICD-10-CM

## 2025-08-12 DIAGNOSIS — J45.20 MILD INTERMITTENT ASTHMA WITHOUT COMPLICATION: ICD-10-CM

## 2025-08-12 LAB
25(OH)D3 SERPL-MCNC: 27 NG/ML (ref 30–100)
ALBUMIN SERPL BCG-MCNC: 4.2 G/DL (ref 3.4–4.9)
ALP SERPL-CCNC: 49 U/L (ref 38–126)
ALT SERPL W/O P-5'-P-CCNC: 18 U/L (ref 10–35)
ANION GAP SERPL CALC-SCNC: 14 MEQ/L (ref 8–16)
AST SERPL-CCNC: 18 U/L (ref 10–35)
BASOPHILS ABSOLUTE: 0.1 THOU/MM3 (ref 0–0.1)
BASOPHILS NFR BLD AUTO: 0.6 %
BILIRUB SERPL-MCNC: 0.3 MG/DL (ref 0.3–1.2)
BUN SERPL-MCNC: 8 MG/DL (ref 8–23)
CALCIUM SERPL-MCNC: 9.5 MG/DL (ref 8.5–10.5)
CHLORIDE SERPL-SCNC: 106 MEQ/L (ref 98–111)
CHOLEST SERPL-MCNC: 217 MG/DL (ref 100–199)
CO2 SERPL-SCNC: 22 MEQ/L (ref 22–29)
CREAT SERPL-MCNC: 0.7 MG/DL (ref 0.5–0.9)
DEPRECATED MEAN GLUCOSE BLD GHB EST-ACNC: 111 MG/DL (ref 70–126)
DEPRECATED RDW RBC AUTO: 47.1 FL (ref 35–45)
EOSINOPHIL NFR BLD AUTO: 2.9 %
EOSINOPHILS ABSOLUTE: 0.3 THOU/MM3 (ref 0–0.4)
ERYTHROCYTE [DISTWIDTH] IN BLOOD BY AUTOMATED COUNT: 13.2 % (ref 11.5–14.5)
GFR SERPL CREATININE-BSD FRML MDRD: > 90 ML/MIN/1.73M2
GLUCOSE SERPL-MCNC: 108 MG/DL (ref 74–109)
HBA1C MFR BLD HPLC: 5.7 % (ref 4–6)
HCT VFR BLD AUTO: 44.2 % (ref 37–47)
HDLC SERPL-MCNC: 38 MG/DL
HGB BLD-MCNC: 14.3 GM/DL (ref 12–16)
IMM GRANULOCYTES # BLD AUTO: 0.02 THOU/MM3 (ref 0–0.07)
IMM GRANULOCYTES NFR BLD AUTO: 0.2 %
LDLC SERPL CALC-MCNC: 147 MG/DL
LYMPHOCYTES ABSOLUTE: 4.1 THOU/MM3 (ref 1–4.8)
LYMPHOCYTES NFR BLD AUTO: 40.9 %
MCH RBC QN AUTO: 31.2 PG (ref 26–33)
MCHC RBC AUTO-ENTMCNC: 32.4 GM/DL (ref 32.2–35.5)
MCV RBC AUTO: 96.5 FL (ref 81–99)
MONOCYTES ABSOLUTE: 0.6 THOU/MM3 (ref 0.4–1.3)
MONOCYTES NFR BLD AUTO: 5.5 %
NEUTROPHILS ABSOLUTE: 5 THOU/MM3 (ref 1.8–7.7)
NEUTROPHILS NFR BLD AUTO: 49.9 %
NRBC BLD AUTO-RTO: 0 /100 WBC
PLATELET # BLD AUTO: 210 THOU/MM3 (ref 130–400)
PLATELET BLD QL SMEAR: ADEQUATE
PMV BLD AUTO: 12.9 FL (ref 9.4–12.4)
POTASSIUM SERPL-SCNC: 3.8 MEQ/L (ref 3.5–5.2)
PROT SERPL-MCNC: 6.8 G/DL (ref 6.4–8.3)
RBC # BLD AUTO: 4.58 MILL/MM3 (ref 4.2–5.4)
SCAN OF BLOOD SMEAR: NORMAL
SODIUM SERPL-SCNC: 142 MEQ/L (ref 135–145)
T4 FREE SERPL-MCNC: 1.1 NG/DL (ref 0.92–1.68)
TRIGL SERPL-MCNC: 158 MG/DL (ref 0–199)
TSH SERPL DL<=0.05 MIU/L-ACNC: 1.98 UIU/ML (ref 0.27–4.2)
VARIANT LYMPHS BLD QL SMEAR: ABNORMAL %
VIT B12 SERPL-MCNC: 419 PG/ML (ref 232–1245)
WBC # BLD AUTO: 10 THOU/MM3 (ref 4.8–10.8)

## 2025-08-12 PROCEDURE — 77063 BREAST TOMOSYNTHESIS BI: CPT

## 2025-08-12 RX ORDER — ATORVASTATIN CALCIUM 10 MG/1
10 TABLET, FILM COATED ORAL EVERY EVENING
Qty: 90 TABLET | Refills: 3 | Status: SHIPPED | OUTPATIENT
Start: 2025-08-12

## 2025-08-12 RX ORDER — SYRINGE W-NEEDLE,DISPOSAB,3 ML 25GX5/8"
SYRINGE, EMPTY DISPOSABLE MISCELLANEOUS
Qty: 30 EACH | Refills: 1 | Status: SHIPPED | OUTPATIENT
Start: 2025-08-12

## 2025-08-12 RX ORDER — CYANOCOBALAMIN 1000 UG/ML
INJECTION, SOLUTION INTRAMUSCULAR; SUBCUTANEOUS
Qty: 10 ML | Refills: 1 | Status: SHIPPED | OUTPATIENT
Start: 2025-08-12

## 2025-08-15 ENCOUNTER — TELEPHONE (OUTPATIENT)
Dept: FAMILY MEDICINE CLINIC | Age: 45
End: 2025-08-15

## 2025-08-15 ENCOUNTER — RESULTS FOLLOW-UP (OUTPATIENT)
Dept: FAMILY MEDICINE CLINIC | Age: 45
End: 2025-08-15

## 2025-08-15 ENCOUNTER — HOSPITAL ENCOUNTER (OUTPATIENT)
Dept: WOMENS IMAGING | Age: 45
Discharge: HOME | End: 2025-08-15
Payer: COMMERCIAL

## 2025-08-15 DIAGNOSIS — Z12.39 ENCOUNTER FOR SCREENING FOR MALIGNANT NEOPLASM OF BREAST, UNSPECIFIED SCREENING MODALITY: ICD-10-CM

## 2025-08-15 DIAGNOSIS — R92.30 DENSE BREAST TISSUE: ICD-10-CM

## 2025-08-15 PROCEDURE — 76641 ULTRASOUND BREAST COMPLETE: CPT

## 2025-08-21 ENCOUNTER — OFFICE VISIT (OUTPATIENT)
Dept: FAMILY MEDICINE CLINIC | Age: 45
End: 2025-08-21
Payer: COMMERCIAL

## 2025-08-21 VITALS
OXYGEN SATURATION: 97 % | SYSTOLIC BLOOD PRESSURE: 124 MMHG | HEART RATE: 81 BPM | BODY MASS INDEX: 37.2 KG/M2 | RESPIRATION RATE: 16 BRPM | HEIGHT: 67 IN | DIASTOLIC BLOOD PRESSURE: 88 MMHG | TEMPERATURE: 97.8 F | WEIGHT: 237 LBS

## 2025-08-21 DIAGNOSIS — F32.A DEPRESSION, UNSPECIFIED DEPRESSION TYPE: ICD-10-CM

## 2025-08-21 DIAGNOSIS — F41.9 ANXIETY: ICD-10-CM

## 2025-08-21 DIAGNOSIS — M25.561 ACUTE PAIN OF RIGHT KNEE: ICD-10-CM

## 2025-08-21 DIAGNOSIS — F31.9 BIPOLAR DEPRESSION (HCC): ICD-10-CM

## 2025-08-21 DIAGNOSIS — M25.461 SWELLING OF RIGHT KNEE: ICD-10-CM

## 2025-08-21 DIAGNOSIS — E66.01 CLASS 2 SEVERE OBESITY WITH SERIOUS COMORBIDITY AND BODY MASS INDEX (BMI) OF 37.0 TO 37.9 IN ADULT, UNSPECIFIED OBESITY TYPE (HCC): ICD-10-CM

## 2025-08-21 DIAGNOSIS — E66.812 CLASS 2 SEVERE OBESITY WITH SERIOUS COMORBIDITY AND BODY MASS INDEX (BMI) OF 37.0 TO 37.9 IN ADULT, UNSPECIFIED OBESITY TYPE (HCC): ICD-10-CM

## 2025-08-21 DIAGNOSIS — Z76.89 ENCOUNTER FOR WEIGHT MANAGEMENT: Primary | ICD-10-CM

## 2025-08-21 PROCEDURE — G8417 CALC BMI ABV UP PARAM F/U: HCPCS | Performed by: NURSE PRACTITIONER

## 2025-08-21 PROCEDURE — 4004F PT TOBACCO SCREEN RCVD TLK: CPT | Performed by: NURSE PRACTITIONER

## 2025-08-21 PROCEDURE — 99215 OFFICE O/P EST HI 40 MIN: CPT | Performed by: NURSE PRACTITIONER

## 2025-08-21 PROCEDURE — G8427 DOCREV CUR MEDS BY ELIG CLIN: HCPCS | Performed by: NURSE PRACTITIONER

## 2025-08-21 RX ORDER — LAMOTRIGINE 25 MG/1
50 TABLET ORAL DAILY
Qty: 60 TABLET | Refills: 3 | Status: SHIPPED | OUTPATIENT
Start: 2025-08-21

## 2025-08-21 RX ORDER — METHYLPREDNISOLONE ACETATE 80 MG/ML
60 INJECTION, SUSPENSION INTRA-ARTICULAR; INTRALESIONAL; INTRAMUSCULAR; SOFT TISSUE ONCE
Status: COMPLETED | OUTPATIENT
Start: 2025-08-21 | End: 2025-08-21

## 2025-08-21 RX ORDER — PHENTERMINE HYDROCHLORIDE 37.5 MG/1
37.5 TABLET ORAL
Qty: 30 TABLET | Refills: 0 | Status: SHIPPED | OUTPATIENT
Start: 2025-08-21 | End: 2025-09-20

## 2025-08-21 RX ADMIN — METHYLPREDNISOLONE ACETATE 60 MG: 80 INJECTION, SUSPENSION INTRA-ARTICULAR; INTRALESIONAL; INTRAMUSCULAR; SOFT TISSUE at 11:35

## 2025-08-25 ENCOUNTER — PATIENT MESSAGE (OUTPATIENT)
Dept: FAMILY MEDICINE CLINIC | Age: 45
End: 2025-08-25

## 2025-08-25 DIAGNOSIS — G62.9 NEUROPATHY: ICD-10-CM

## 2025-08-25 DIAGNOSIS — F41.9 ANXIETY: Primary | ICD-10-CM

## 2025-08-25 DIAGNOSIS — G47.9 SLEEP DISTURBANCE: ICD-10-CM

## 2025-08-25 RX ORDER — GABAPENTIN 300 MG/1
300 CAPSULE ORAL 3 TIMES DAILY
Qty: 270 CAPSULE | Refills: 1 | Status: SHIPPED | OUTPATIENT
Start: 2025-08-25 | End: 2026-02-21